# Patient Record
Sex: MALE | Race: WHITE | NOT HISPANIC OR LATINO | Employment: UNEMPLOYED | URBAN - METROPOLITAN AREA
[De-identification: names, ages, dates, MRNs, and addresses within clinical notes are randomized per-mention and may not be internally consistent; named-entity substitution may affect disease eponyms.]

---

## 2023-10-19 ENCOUNTER — OFFICE VISIT (OUTPATIENT)
Dept: PRIMARY CARE | Facility: CLINIC | Age: 58
End: 2023-10-19
Payer: COMMERCIAL

## 2023-10-19 VITALS
WEIGHT: 247 LBS | SYSTOLIC BLOOD PRESSURE: 110 MMHG | DIASTOLIC BLOOD PRESSURE: 78 MMHG | BODY MASS INDEX: 37.44 KG/M2 | RESPIRATION RATE: 18 BRPM | HEART RATE: 90 BPM | HEIGHT: 68 IN | TEMPERATURE: 95.4 F | OXYGEN SATURATION: 98 %

## 2023-10-19 DIAGNOSIS — F10.91 ALCOHOL USE DISORDER IN REMISSION: ICD-10-CM

## 2023-10-19 DIAGNOSIS — H91.93 BILATERAL CHANGE IN HEARING: ICD-10-CM

## 2023-10-19 DIAGNOSIS — Q24.9: ICD-10-CM

## 2023-10-19 DIAGNOSIS — F20.9 SCHIZOPHRENIA, UNSPECIFIED TYPE (MULTI): Primary | ICD-10-CM

## 2023-10-19 DIAGNOSIS — R52 COMPLAINTS OF TOTAL BODY PAIN: ICD-10-CM

## 2023-10-19 DIAGNOSIS — R45.851 SUICIDAL THOUGHTS: ICD-10-CM

## 2023-10-19 DIAGNOSIS — E11.9 TYPE 2 DIABETES MELLITUS WITHOUT COMPLICATION, WITHOUT LONG-TERM CURRENT USE OF INSULIN (MULTI): ICD-10-CM

## 2023-10-19 DIAGNOSIS — R60.0 LOWER LEG EDEMA: ICD-10-CM

## 2023-10-19 LAB
ANION GAP SERPL CALC-SCNC: 14 MMOL/L (ref 10–20)
BNP SERPL-MCNC: 4 PG/ML (ref 0–99)
BUN SERPL-MCNC: 9 MG/DL (ref 6–23)
CALCIUM SERPL-MCNC: 9.4 MG/DL (ref 8.6–10.6)
CHLORIDE SERPL-SCNC: 99 MMOL/L (ref 98–107)
CO2 SERPL-SCNC: 26 MMOL/L (ref 21–32)
CREAT SERPL-MCNC: 0.74 MG/DL (ref 0.5–1.3)
EST. AVERAGE GLUCOSE BLD GHB EST-MCNC: 171 MG/DL
GFR SERPL CREATININE-BSD FRML MDRD: >90 ML/MIN/1.73M*2
GLUCOSE SERPL-MCNC: 173 MG/DL (ref 74–99)
HBA1C MFR BLD: 7.6 %
POTASSIUM SERPL-SCNC: 4.4 MMOL/L (ref 3.5–5.3)
SODIUM SERPL-SCNC: 135 MMOL/L (ref 136–145)

## 2023-10-19 PROCEDURE — 99214 OFFICE O/P EST MOD 30 MIN: CPT | Mod: 25 | Performed by: NURSE PRACTITIONER

## 2023-10-19 PROCEDURE — 80048 BASIC METABOLIC PNL TOTAL CA: CPT | Mod: CMCLAB | Performed by: NURSE PRACTITIONER

## 2023-10-19 PROCEDURE — 3051F HG A1C>EQUAL 7.0%<8.0%: CPT | Performed by: NURSE PRACTITIONER

## 2023-10-19 PROCEDURE — 36415 COLL VENOUS BLD VENIPUNCTURE: CPT | Performed by: NURSE PRACTITIONER

## 2023-10-19 PROCEDURE — 99204 OFFICE O/P NEW MOD 45 MIN: CPT | Performed by: NURSE PRACTITIONER

## 2023-10-19 PROCEDURE — 83036 HEMOGLOBIN GLYCOSYLATED A1C: CPT | Mod: CMCLAB | Performed by: NURSE PRACTITIONER

## 2023-10-19 PROCEDURE — 3078F DIAST BP <80 MM HG: CPT | Performed by: NURSE PRACTITIONER

## 2023-10-19 PROCEDURE — 83880 ASSAY OF NATRIURETIC PEPTIDE: CPT | Performed by: NURSE PRACTITIONER

## 2023-10-19 PROCEDURE — 1036F TOBACCO NON-USER: CPT | Performed by: NURSE PRACTITIONER

## 2023-10-19 PROCEDURE — 3074F SYST BP LT 130 MM HG: CPT | Performed by: NURSE PRACTITIONER

## 2023-10-19 RX ORDER — BLOOD-GLUCOSE SENSOR
EACH MISCELLANEOUS
Qty: 3 EACH | Refills: 3 | Status: SHIPPED | OUTPATIENT
Start: 2023-10-19

## 2023-10-19 RX ORDER — BLOOD-GLUCOSE,RECEIVER,CONT
EACH MISCELLANEOUS
Qty: 1 EACH | Refills: 0 | Status: SHIPPED | OUTPATIENT
Start: 2023-10-19

## 2023-10-19 RX ORDER — DULAGLUTIDE 1.5 MG/.5ML
1.5 INJECTION, SOLUTION SUBCUTANEOUS
Qty: 2 ML | Refills: 11 | Status: SHIPPED | OUTPATIENT
Start: 2023-10-19

## 2023-10-19 ASSESSMENT — COLUMBIA-SUICIDE SEVERITY RATING SCALE - C-SSRS
6. HAVE YOU EVER DONE ANYTHING, STARTED TO DO ANYTHING, OR PREPARED TO DO ANYTHING TO END YOUR LIFE?: YES
2. HAVE YOU ACTUALLY HAD ANY THOUGHTS OF KILLING YOURSELF?: NO
1. IN THE PAST MONTH, HAVE YOU WISHED YOU WERE DEAD OR WISHED YOU COULD GO TO SLEEP AND NOT WAKE UP?: YES

## 2023-10-19 ASSESSMENT — PAIN SCALES - GENERAL: PAINLEVEL: 8

## 2023-10-19 ASSESSMENT — PATIENT HEALTH QUESTIONNAIRE - PHQ9
10. IF YOU CHECKED OFF ANY PROBLEMS, HOW DIFFICULT HAVE THESE PROBLEMS MADE IT FOR YOU TO DO YOUR WORK, TAKE CARE OF THINGS AT HOME, OR GET ALONG WITH OTHER PEOPLE: EXTREMELY DIFFICULT
8. MOVING OR SPEAKING SO SLOWLY THAT OTHER PEOPLE COULD HAVE NOTICED. OR THE OPPOSITE, BEING SO FIGETY OR RESTLESS THAT YOU HAVE BEEN MOVING AROUND A LOT MORE THAN USUAL: NOT AT ALL
4. FEELING TIRED OR HAVING LITTLE ENERGY: MORE THAN HALF THE DAYS
5. POOR APPETITE OR OVEREATING: NEARLY EVERY DAY
7. TROUBLE CONCENTRATING ON THINGS, SUCH AS READING THE NEWSPAPER OR WATCHING TELEVISION: NEARLY EVERY DAY
6. FEELING BAD ABOUT YOURSELF - OR THAT YOU ARE A FAILURE OR HAVE LET YOURSELF OR YOUR FAMILY DOWN: NEARLY EVERY DAY
SUM OF ALL RESPONSES TO PHQ9 QUESTIONS 1 AND 2: 3
2. FEELING DOWN, DEPRESSED OR HOPELESS: NEARLY EVERY DAY
1. LITTLE INTEREST OR PLEASURE IN DOING THINGS: NOT AT ALL

## 2023-10-19 ASSESSMENT — ENCOUNTER SYMPTOMS
LOSS OF SENSATION IN FEET: 1
OCCASIONAL FEELINGS OF UNSTEADINESS: 1
DEPRESSION: 1

## 2023-10-19 NOTE — PROGRESS NOTES
"Subjective   Christopher Saldivar is a 58 y.o. male who presents for Establish Care.  HPI  Mr. Saldivar is a 57 yo M here today to establish care. Has been lost to primary care. Previously seen through [facility]. Is currently staying at [facility]    Diabetes  Currently misusing victoza. Has been taking this medication up to 3x daily.   Denies polydipsia, polyuria, headache, blurred vision, or lightheadedness  He would like to look at alternative ways to treat his diabetes which he feels is uncontrolled.      Bodily pain  Notes entire head to toe full body pain. Has been previously seen by pain management. Failed gabapentin trial. He is open to re-establishment.     Followed by psych through the diversion program. Discussed that they will continue to manage his current medications.   Taking 50mg of trazodone currently without desired sleep assistance.   PMH: Schizophrenia and   Anxiety/ insomnia  He names Ativan by trade name several times noting that he does not often leave the house because he is no longer prescribed the medication and it was the only way he could function. Reports that he has discussed this with his psychiatrist.     Recent concern for a change in hearing   Requesting hearing exam.     All systems reviewed. Review of systems negative except for noted positives in HPI    Objective     /78 (BP Location: Left arm, Patient Position: Sitting, BP Cuff Size: Large adult)   Pulse 90   Temp 35.2 °C (95.4 °F)   Resp 18   Ht 1.727 m (5' 8\")   Wt 112 kg (247 lb)   SpO2 98%   BMI 37.56 kg/m²    Vital signs noted and reviewed.       Physical Exam  Constitutional:       Appearance: Normal appearance.   Cardiovascular:      Rate and Rhythm: Normal rate and regular rhythm.   Pulmonary:      Effort: Pulmonary effort is normal. No respiratory distress.      Breath sounds: Normal breath sounds.   Musculoskeletal:      Right lower leg: Edema present.      Left lower leg: Edema present.   Skin:     General: Skin is " warm and dry.   Neurological:      Mental Status: He is oriented to person, place, and time.   Psychiatric:         Mood and Affect: Mood normal.             Assessment/Plan   Problem List Items Addressed This Visit    None  Visit Diagnoses       Schizophrenia, unspecified type (CMS/HCC)    -  Primary    Relevant Orders    Referral to Psychiatry    Type 2 diabetes mellitus without complication, without long-term current use of insulin (CMS/[facility])        Relevant Medications    dulaglutide (Trulicity) 1.5 mg/0.5 mL pen injector injection    Dexcom G4 platinum  (Dexcom G7 ) misc    blood-glucose sensor (Dexcom G7 Sensor) device    Other Relevant Orders    Hemoglobin A1C (Completed)    Basic Metabolic Panel (Completed)    Referral to Ophthalmology    Suicidal thoughts        Relevant Orders    Referral to Psychiatry    Complaints of total body pain        Relevant Orders    Referral to Pain Medicine    Bilateral change in hearing        Relevant Orders    Referral to Audiology    Cardiac anomaly        Relevant Orders    Referral to Cardiology    Alcohol use disorder in remission        Relevant Orders    Referral to Psychiatry    Lower leg edema        Relevant Orders    B-type natriuretic peptide (Completed)

## 2023-10-19 NOTE — PATIENT INSTRUCTIONS
Follow up in 4 months or sooner if needed.     STOP victoza.   START Trulicity once weekly.   Continue to self monitor your blood sugar and take your medications, as directed.   Decrease refined sugars and carbohydrates in your diet.   DO NOT SKIP MEALS! It is important for your blood sugar to have small healthy meals throughout the day.  Make sure to keep a snack on your person at all times, if needed, for low blood sugar.  Exercise for 30 minutes daily.    Drink adequate liquids before, during, and after exercise to prevent dehydration, which can alter blood sugar levels.    Today we completed blood work. We will contact you with any abnormalities from this testing.      Call to schedule with pain management, audiology, opthalmology, cardiology, and psychiatry.     If you ever feel you are a danger to yourself or others, call 911 or go to the nearest emergency room.

## 2023-10-31 ENCOUNTER — TELEPHONE (OUTPATIENT)
Dept: CARDIOLOGY | Facility: HOSPITAL | Age: 58
End: 2023-10-31
Payer: COMMERCIAL

## 2023-10-31 NOTE — TELEPHONE ENCOUNTER
Explained to patient he will be cancelled tomorrow with dr alejandro and he can call [facility] to schedule an appt. Explained reason all questions answered to patients satisfaction

## 2023-11-01 ENCOUNTER — APPOINTMENT (OUTPATIENT)
Dept: CARDIOLOGY | Facility: HOSPITAL | Age: 58
End: 2023-11-01
Payer: COMMERCIAL

## 2023-11-10 ENCOUNTER — HOSPITAL ENCOUNTER (OUTPATIENT)
Dept: RADIOLOGY | Facility: HOSPITAL | Age: 58
Discharge: HOME | End: 2023-11-10
Payer: COMMERCIAL

## 2023-11-10 ENCOUNTER — OFFICE VISIT (OUTPATIENT)
Dept: PAIN MEDICINE | Facility: CLINIC | Age: 58
End: 2023-11-10
Payer: COMMERCIAL

## 2023-11-10 VITALS
DIASTOLIC BLOOD PRESSURE: 86 MMHG | HEIGHT: 68 IN | HEART RATE: 89 BPM | SYSTOLIC BLOOD PRESSURE: 129 MMHG | WEIGHT: 250 LBS | BODY MASS INDEX: 37.89 KG/M2

## 2023-11-10 DIAGNOSIS — G89.29 CHRONIC RIGHT-SIDED LOW BACK PAIN WITH RIGHT-SIDED SCIATICA: ICD-10-CM

## 2023-11-10 DIAGNOSIS — R52 COMPLAINTS OF TOTAL BODY PAIN: ICD-10-CM

## 2023-11-10 DIAGNOSIS — M54.16 LUMBAR RADICULOPATHY: Primary | ICD-10-CM

## 2023-11-10 DIAGNOSIS — M54.41 CHRONIC RIGHT-SIDED LOW BACK PAIN WITH RIGHT-SIDED SCIATICA: ICD-10-CM

## 2023-11-10 DIAGNOSIS — M54.16 LUMBAR RADICULOPATHY: ICD-10-CM

## 2023-11-10 PROCEDURE — 1036F TOBACCO NON-USER: CPT | Performed by: PAIN MEDICINE

## 2023-11-10 PROCEDURE — 99203 OFFICE O/P NEW LOW 30 MIN: CPT | Performed by: PAIN MEDICINE

## 2023-11-10 PROCEDURE — 72110 X-RAY EXAM L-2 SPINE 4/>VWS: CPT

## 2023-11-10 PROCEDURE — 72110 X-RAY EXAM L-2 SPINE 4/>VWS: CPT | Mod: FY

## 2023-11-10 PROCEDURE — 72110 X-RAY EXAM L-2 SPINE 4/>VWS: CPT | Performed by: STUDENT IN AN ORGANIZED HEALTH CARE EDUCATION/TRAINING PROGRAM

## 2023-11-10 RX ORDER — DOXEPIN HYDROCHLORIDE 50 MG/1
50 CAPSULE ORAL
COMMUNITY
Start: 2023-10-24 | End: 2023-11-22 | Stop reason: ALTCHOICE

## 2023-11-10 RX ORDER — LITHIUM CARBONATE 300 MG/1
CAPSULE ORAL
COMMUNITY
Start: 2023-10-24 | End: 2023-11-22 | Stop reason: SDUPTHER

## 2023-11-10 RX ORDER — DULOXETIN HYDROCHLORIDE 60 MG/1
60 CAPSULE, DELAYED RELEASE ORAL
COMMUNITY
Start: 2023-10-31 | End: 2023-11-22

## 2023-11-10 RX ORDER — IBUPROFEN 600 MG/1
600 TABLET ORAL 4 TIMES DAILY PRN
COMMUNITY
Start: 2023-10-30

## 2023-11-10 RX ORDER — LIDOCAINE 50 MG/G
PATCH TOPICAL
COMMUNITY
Start: 2023-10-20

## 2023-11-10 RX ORDER — TOPIRAMATE 25 MG/1
TABLET ORAL
Qty: 300 TABLET | Refills: 0 | Status: SHIPPED | OUTPATIENT
Start: 2023-11-10 | End: 2024-01-09

## 2023-11-10 SDOH — SOCIAL STABILITY: SOCIAL NETWORK: SOCIAL ACTIVITY:: 6

## 2023-11-10 ASSESSMENT — PAIN SCALES - GENERAL
PAINLEVEL: 10-WORST PAIN EVER
PAINLEVEL_OUTOF10: 10 - WORST POSSIBLE PAIN

## 2023-11-10 ASSESSMENT — PAIN DESCRIPTION - DESCRIPTORS: DESCRIPTORS: ACHING;BURNING

## 2023-11-10 ASSESSMENT — PAIN - FUNCTIONAL ASSESSMENT: PAIN_FUNCTIONAL_ASSESSMENT: 0-10

## 2023-11-10 NOTE — PROGRESS NOTES
Subjective   Patient ID: Christopher Saldivar is a 58 y.o. male who presents for Back Pain (C/O lower back pain, radiates down legs).    HPI:   Patient with history of diabetes, bipolar disorder, and alcohol use disorder currently in remission presents to the clinic as a new patient for a 2 week history of low back pain that radiates down his bilateral lower extremities. He had a previous injury 5 years ago that presented with similar symptoms and was treated at [facility]. MRI at the time showed epidural lipomatosis with mild canal stenosis. He had two epidural steroid injections that did not improve his pain at the time. After he recovered from his injury, he was pain free until 2 weeks ago. The pain is severe when walking and radiates down the anterolateral surface of his bilateral lower extremities to just below the knees. His pain improves with leaning forward. He has numbness and tingling from below the knees to his feet and has a history of diabetic neuropathy. He reports weakness in his bilateral lower extremities. He has been taking ibuprofen and using lidocaine patches without pain relief. He has not done physical therapy since his initial injury 5 years ago.     Review of Systems   13-point ROS done and negative except for HPI.       Current Outpatient Medications:     doxepin (SINEquan) 50 mg capsule, Take 1 capsule (50 mg) by mouth., Disp: , Rfl:     DULoxetine (Cymbalta) 60 mg DR capsule, Take 1 capsule (60 mg) by mouth once daily., Disp: , Rfl:     ibuprofen 600 mg tablet, Take 1 tablet (600 mg) by mouth 4 times a day as needed., Disp: , Rfl:     lidocaine (Lidoderm) 5 % patch, , Disp: , Rfl:     lithium 300 mg capsule, , Disp: , Rfl:     blood-glucose sensor (Dexcom G7 Sensor) device, Apply 1 sensor every 10 days for continuous glucose monitoring, Disp: 3 each, Rfl: 3    Dexcom G4 platinum  (Dexcom G7 ) misc, Use as instructed, Disp: 1 each, Rfl: 0    dulaglutide (Trulicity) 1.5 mg/0.5 mL  pen injector injection, Inject 1.5 mg under the skin 1 (one) time per week., Disp: 2 mL, Rfl: 11     No past medical history on file.     No past surgical history on file.     No family history on file.     No Known Allergies     Objective     Vitals:    11/10/23 0748   BP: 129/86   Pulse: 89        Physical Exam      General: NAD, well groomed, well nourished  Eyes: Non-icteric sclera, EOMI  Ears, Nose, Mouth, and Throat: External ears and nose appear to be without deformity or rash. No lesions or masses noted. Hearing is grossly intact.   Neck: Trachea midline  Respiratory: Nonlabored breathing   Cardiovascular: 2+ peripheral edema   Skin: No rashes or open lesions/ulcers identified on skin.    Back:   Palpation: Tenderness to palpation over lumbar paraspinous muscles.   Straight leg raise: Reproduces their pain, bilaterally  SI Joint: No tenderness to palpation over SI joint, bilaterally. No pain with SRINIVASAN test, bilaterally    Neurologic:   Cranial nerves grossly intact.   Strength: 4/5 and symmetric throughout.   Sensation: Diminished sensation to light touch over anterolateral surface of bilateral thighs, diminished sensation to pinprick on bilateral feet in L4 and L5 distribution.    Psychiatric: Alert, orientation to person, place, and time. Cooperative.      Assessment/Plan   Christopher Saldivar is a 58 year old male with history of diabetes, bipolar disorder, and alcohol use disorder currently in remission who presents with lumbar back pain that radiates to his bilateral lower extremities. MRI report (cannot review the images) from 2019 showed epidural lipomatosis with mild canal stenosis. History and exam suspicious for neurogenic claudication. The patient would benefit from a PT referral and initiation of topiramate. Will obtain a lumbar x-ray.      Plan:  - Topiramate titration  - PT referral  - Lumbar x-ray     The patient was invited to contact us back anytime with any questions or concerns and follow-up  with us in the office as needed.
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
## 2023-11-15 PROBLEM — E11.9 TYPE 2 DIABETES MELLITUS WITHOUT COMPLICATION, WITHOUT LONG-TERM CURRENT USE OF INSULIN (MULTI): Status: ACTIVE | Noted: 2019-11-14

## 2023-11-15 PROBLEM — F31.4: Status: ACTIVE | Noted: 2023-10-27

## 2023-11-15 PROBLEM — F10.20 ALCOHOL USE DISORDER, SEVERE, DEPENDENCE (MULTI): Status: ACTIVE | Noted: 2023-10-27

## 2023-11-15 PROBLEM — R06.09 DYSPNEA ON EXERTION: Status: ACTIVE | Noted: 2022-06-14

## 2023-11-15 PROBLEM — S39.012A STRAIN OF LUMBAR REGION: Status: ACTIVE | Noted: 2019-02-21

## 2023-11-15 PROBLEM — K76.0 HEPATIC STEATOSIS: Status: ACTIVE | Noted: 2019-06-24

## 2023-11-15 PROBLEM — F10.11 ALCOHOL ABUSE, IN REMISSION: Status: ACTIVE | Noted: 2023-10-04

## 2023-11-15 PROBLEM — E66.9 OBESITY: Status: ACTIVE | Noted: 2020-08-17

## 2023-11-15 PROBLEM — M79.604 PAIN OF RIGHT LOWER EXTREMITY: Status: ACTIVE | Noted: 2023-11-15

## 2023-11-15 PROBLEM — I71.20 THORACIC AORTIC ANEURYSM WITHOUT RUPTURE (CMS-HCC): Status: ACTIVE | Noted: 2019-09-30

## 2023-11-15 PROBLEM — R26.2 INABILITY TO WALK: Status: ACTIVE | Noted: 2019-01-25

## 2023-11-15 PROBLEM — F43.10 POSTTRAUMATIC STRESS DISORDER: Status: ACTIVE | Noted: 2023-10-27

## 2023-11-15 PROBLEM — G82.20 PARAPARESIS (MULTI): Status: ACTIVE | Noted: 2023-10-20

## 2023-11-15 PROBLEM — M48.062 SPINAL STENOSIS OF LUMBAR REGION WITH NEUROGENIC CLAUDICATION: Status: ACTIVE | Noted: 2023-11-15

## 2023-11-15 PROBLEM — M54.10: Status: ACTIVE | Noted: 2019-02-21

## 2023-11-15 PROBLEM — M79.10 MYALGIA: Status: ACTIVE | Noted: 2019-01-29

## 2023-11-15 PROBLEM — R70.0 ELEVATED SED RATE: Status: ACTIVE | Noted: 2019-01-21

## 2023-11-15 PROBLEM — S06.4XAA EPIDURAL HEMATOMA (MULTI): Status: ACTIVE | Noted: 2017-08-09

## 2023-11-15 PROBLEM — G89.4 CHRONIC PAIN SYNDROME: Status: ACTIVE | Noted: 2019-10-01

## 2023-11-15 RX ORDER — ALCOHOL ANTISEPTIC PADS
PADS, MEDICATED (EA) TOPICAL
COMMUNITY
Start: 2023-09-20

## 2023-11-15 RX ORDER — QUETIAPINE FUMARATE 100 MG/1
1 TABLET, FILM COATED ORAL NIGHTLY
COMMUNITY
Start: 2023-11-13 | End: 2023-11-22 | Stop reason: ALTCHOICE

## 2023-11-15 RX ORDER — LISINOPRIL 10 MG/1
1 TABLET ORAL DAILY
COMMUNITY
Start: 2022-08-04

## 2023-11-15 RX ORDER — ATORVASTATIN CALCIUM 40 MG/1
40 TABLET, FILM COATED ORAL
COMMUNITY
Start: 2022-09-15 | End: 2023-11-28 | Stop reason: DRUGHIGH

## 2023-11-15 RX ORDER — GABAPENTIN 100 MG/1
100 CAPSULE ORAL 3 TIMES DAILY
COMMUNITY
Start: 2022-06-15

## 2023-11-15 RX ORDER — IBUPROFEN 200 MG
600 TABLET ORAL EVERY 6 HOURS PRN
COMMUNITY
Start: 2023-10-30

## 2023-11-15 RX ORDER — LANCETS 30 GAUGE
EACH MISCELLANEOUS
COMMUNITY
Start: 2023-09-20

## 2023-11-15 RX ORDER — DICLOFENAC SODIUM 10 MG/G
GEL TOPICAL
COMMUNITY
Start: 2022-08-04

## 2023-11-15 RX ORDER — OMEPRAZOLE 20 MG/1
20 CAPSULE, DELAYED RELEASE ORAL
COMMUNITY
Start: 2022-06-16

## 2023-11-15 RX ORDER — LISINOPRIL 10 MG/1
10 TABLET ORAL
COMMUNITY
Start: 2023-10-09 | End: 2024-01-07

## 2023-11-15 RX ORDER — PEN NEEDLE, DIABETIC 32GX 5/32"
NEEDLE, DISPOSABLE MISCELLANEOUS
COMMUNITY
Start: 2023-09-20

## 2023-11-15 RX ORDER — ALBUTEROL SULFATE 90 UG/1
AEROSOL, METERED RESPIRATORY (INHALATION)
COMMUNITY
Start: 2023-10-09

## 2023-11-15 RX ORDER — BUPROPION HYDROCHLORIDE 300 MG/1
TABLET ORAL
COMMUNITY
Start: 2023-11-09 | End: 2023-11-22 | Stop reason: ALTCHOICE

## 2023-11-15 RX ORDER — AMLODIPINE BESYLATE 10 MG/1
10 TABLET ORAL
COMMUNITY
Start: 2023-10-09 | End: 2024-01-07

## 2023-11-15 RX ORDER — BLOOD-GLUCOSE METER
EACH MISCELLANEOUS
COMMUNITY
Start: 2023-09-20

## 2023-11-15 RX ORDER — AMLODIPINE BESYLATE 5 MG/1
1 TABLET ORAL DAILY
COMMUNITY
Start: 2022-08-04

## 2023-11-15 RX ORDER — LIRAGLUTIDE 6 MG/ML
INJECTION SUBCUTANEOUS
COMMUNITY

## 2023-11-15 RX ORDER — BUPROPION HYDROCHLORIDE 150 MG/1
150 TABLET ORAL
COMMUNITY
Start: 2023-10-31 | End: 2023-11-22 | Stop reason: ALTCHOICE

## 2023-11-15 RX ORDER — COVID-19 ANTIGEN TEST
KIT MISCELLANEOUS
COMMUNITY
Start: 2022-12-06

## 2023-11-15 RX ORDER — OMEPRAZOLE 20 MG/1
1 CAPSULE, DELAYED RELEASE ORAL
COMMUNITY
Start: 2023-10-20 | End: 2024-01-18

## 2023-11-15 RX ORDER — ACETAMINOPHEN 500 MG
1 TABLET ORAL
COMMUNITY
Start: 2021-04-05

## 2023-11-15 RX ORDER — PEN NEEDLE, DIABETIC 31 GX5/16"
NEEDLE, DISPOSABLE MISCELLANEOUS
COMMUNITY
Start: 2023-10-06

## 2023-11-15 RX ORDER — BLOOD SUGAR DIAGNOSTIC
STRIP MISCELLANEOUS
COMMUNITY
Start: 2023-09-20

## 2023-11-15 RX ORDER — HYDROXYZINE HYDROCHLORIDE 25 MG/1
1 TABLET, FILM COATED ORAL NIGHTLY PRN
COMMUNITY
Start: 2020-06-04

## 2023-11-15 RX ORDER — DULOXETIN HYDROCHLORIDE 30 MG/1
CAPSULE, DELAYED RELEASE ORAL
COMMUNITY
Start: 2023-10-09 | End: 2023-11-22

## 2023-11-15 RX ORDER — CICLOPIROX 80 MG/ML
SOLUTION TOPICAL
COMMUNITY
Start: 2023-10-17

## 2023-11-15 RX ORDER — CHOLECALCIFEROL (VITAMIN D3) 25 MCG
1 TABLET ORAL DAILY
COMMUNITY
Start: 2022-06-16

## 2023-11-15 RX ORDER — PRAZOSIN HYDROCHLORIDE 1 MG/1
1 CAPSULE ORAL
COMMUNITY
Start: 2023-10-09 | End: 2023-11-22 | Stop reason: DRUGHIGH

## 2023-11-15 RX ORDER — CLONIDINE HYDROCHLORIDE 0.1 MG/1
0.1 TABLET ORAL
COMMUNITY
Start: 2023-10-24

## 2023-11-15 RX ORDER — TRAZODONE HYDROCHLORIDE 50 MG/1
50 TABLET ORAL
COMMUNITY
Start: 2023-10-09 | End: 2023-11-22 | Stop reason: SDUPTHER

## 2023-11-15 RX ORDER — METFORMIN HYDROCHLORIDE 500 MG/1
1000 TABLET ORAL
COMMUNITY
Start: 2020-11-03

## 2023-11-15 RX ORDER — LANOLIN ALCOHOL/MO/W.PET/CERES
1 CREAM (GRAM) TOPICAL DAILY
COMMUNITY
Start: 2022-06-16

## 2023-11-15 RX ORDER — ASPIRIN 81 MG/1
1 TABLET ORAL DAILY
COMMUNITY
Start: 2022-06-16

## 2023-11-15 RX ORDER — ARIPIPRAZOLE 5 MG/1
5 TABLET ORAL
COMMUNITY
Start: 2023-10-09 | End: 2023-11-22 | Stop reason: DRUGHIGH

## 2023-11-22 ENCOUNTER — OFFICE VISIT (OUTPATIENT)
Dept: BEHAVIORAL HEALTH | Facility: CLINIC | Age: 58
End: 2023-11-22
Payer: COMMERCIAL

## 2023-11-22 VITALS
SYSTOLIC BLOOD PRESSURE: 135 MMHG | HEART RATE: 96 BPM | WEIGHT: 258 LBS | BODY MASS INDEX: 39.1 KG/M2 | DIASTOLIC BLOOD PRESSURE: 88 MMHG | HEIGHT: 68 IN | TEMPERATURE: 98 F

## 2023-11-22 DIAGNOSIS — F43.10 PTSD (POST-TRAUMATIC STRESS DISORDER): ICD-10-CM

## 2023-11-22 DIAGNOSIS — F10.91 ALCOHOL USE DISORDER IN REMISSION: ICD-10-CM

## 2023-11-22 DIAGNOSIS — F31.9 BIPOLAR AFFECTIVE DISORDER, REMISSION STATUS UNSPECIFIED (MULTI): ICD-10-CM

## 2023-11-22 PROCEDURE — 3079F DIAST BP 80-89 MM HG: CPT | Performed by: PSYCHIATRY & NEUROLOGY

## 2023-11-22 PROCEDURE — 99204 OFFICE O/P NEW MOD 45 MIN: CPT | Performed by: PSYCHIATRY & NEUROLOGY

## 2023-11-22 PROCEDURE — 4010F ACE/ARB THERAPY RXD/TAKEN: CPT | Performed by: PSYCHIATRY & NEUROLOGY

## 2023-11-22 PROCEDURE — 3075F SYST BP GE 130 - 139MM HG: CPT | Performed by: PSYCHIATRY & NEUROLOGY

## 2023-11-22 PROCEDURE — 1036F TOBACCO NON-USER: CPT | Performed by: PSYCHIATRY & NEUROLOGY

## 2023-11-22 PROCEDURE — 3051F HG A1C>EQUAL 7.0%<8.0%: CPT | Performed by: PSYCHIATRY & NEUROLOGY

## 2023-11-22 RX ORDER — PRAZOSIN HYDROCHLORIDE 2 MG/1
2 CAPSULE ORAL NIGHTLY
Qty: 30 CAPSULE | Refills: 1 | Status: SHIPPED | OUTPATIENT
Start: 2023-11-22 | End: 2024-01-21

## 2023-11-22 RX ORDER — ARIPIPRAZOLE 10 MG/1
10 TABLET ORAL DAILY
Qty: 30 TABLET | Refills: 1 | Status: SHIPPED | OUTPATIENT
Start: 2023-11-22 | End: 2024-01-21

## 2023-11-22 RX ORDER — LITHIUM CARBONATE 300 MG/1
300 CAPSULE ORAL
Qty: 60 CAPSULE | Refills: 1 | Status: SHIPPED | OUTPATIENT
Start: 2023-11-22 | End: 2024-01-21

## 2023-11-22 RX ORDER — TRAZODONE HYDROCHLORIDE 50 MG/1
TABLET ORAL
Qty: 60 TABLET | Refills: 1 | Status: SHIPPED | OUTPATIENT
Start: 2023-11-22

## 2023-11-22 ASSESSMENT — ENCOUNTER SYMPTOMS
PALPITATIONS: 0
DECREASED CONCENTRATION: 1
DYSPHORIC MOOD: 0
VOMITING: 0
BACK PAIN: 1
NERVOUS/ANXIOUS: 0
DIARRHEA: 1
HYPERACTIVE: 0
SLEEP DISTURBANCE: 1
NAUSEA: 0
TREMORS: 1
CONFUSION: 0
SHORTNESS OF BREATH: 1
HALLUCINATIONS: 1
AGITATION: 0
SEIZURES: 0
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
## 2023-11-28 ENCOUNTER — PHARMACY VISIT (OUTPATIENT)
Dept: PHARMACY | Facility: CLINIC | Age: 58
End: 2023-11-28
Payer: MEDICAID

## 2023-11-28 ENCOUNTER — OFFICE VISIT (OUTPATIENT)
Dept: CARDIOLOGY | Facility: HOSPITAL | Age: 58
End: 2023-11-28
Payer: COMMERCIAL

## 2023-11-28 VITALS
OXYGEN SATURATION: 94 % | HEIGHT: 68 IN | HEART RATE: 105 BPM | DIASTOLIC BLOOD PRESSURE: 86 MMHG | WEIGHT: 256.8 LBS | SYSTOLIC BLOOD PRESSURE: 123 MMHG | BODY MASS INDEX: 38.92 KG/M2

## 2023-11-28 DIAGNOSIS — I50.30 (HFPEF) HEART FAILURE WITH PRESERVED EJECTION FRACTION (MULTI): ICD-10-CM

## 2023-11-28 DIAGNOSIS — I71.20 THORACIC AORTIC ANEURYSM WITHOUT RUPTURE (CMS-HCC): ICD-10-CM

## 2023-11-28 DIAGNOSIS — R60.0 BILATERAL LEG EDEMA: ICD-10-CM

## 2023-11-28 DIAGNOSIS — E11.9 TYPE 2 DIABETES MELLITUS WITHOUT COMPLICATION, WITHOUT LONG-TERM CURRENT USE OF INSULIN (MULTI): ICD-10-CM

## 2023-11-28 DIAGNOSIS — I10 PRIMARY HYPERTENSION: Primary | ICD-10-CM

## 2023-11-28 PROCEDURE — 99214 OFFICE O/P EST MOD 30 MIN: CPT | Performed by: INTERNAL MEDICINE

## 2023-11-28 PROCEDURE — 1036F TOBACCO NON-USER: CPT | Performed by: INTERNAL MEDICINE

## 2023-11-28 PROCEDURE — 4010F ACE/ARB THERAPY RXD/TAKEN: CPT | Performed by: INTERNAL MEDICINE

## 2023-11-28 PROCEDURE — RXMED WILLOW AMBULATORY MEDICATION CHARGE

## 2023-11-28 PROCEDURE — 99204 OFFICE O/P NEW MOD 45 MIN: CPT | Performed by: INTERNAL MEDICINE

## 2023-11-28 PROCEDURE — 3074F SYST BP LT 130 MM HG: CPT | Performed by: INTERNAL MEDICINE

## 2023-11-28 PROCEDURE — 93005 ELECTROCARDIOGRAM TRACING: CPT | Performed by: INTERNAL MEDICINE

## 2023-11-28 PROCEDURE — 3051F HG A1C>EQUAL 7.0%<8.0%: CPT | Performed by: INTERNAL MEDICINE

## 2023-11-28 PROCEDURE — 3079F DIAST BP 80-89 MM HG: CPT | Performed by: INTERNAL MEDICINE

## 2023-11-28 RX ORDER — ATORVASTATIN CALCIUM 80 MG/1
80 TABLET, FILM COATED ORAL DAILY
Qty: 30 TABLET | Refills: 11 | Status: SHIPPED | OUTPATIENT
Start: 2023-11-28 | End: 2024-11-27

## 2023-11-28 ASSESSMENT — PATIENT HEALTH QUESTIONNAIRE - PHQ9
2. FEELING DOWN, DEPRESSED OR HOPELESS: NOT AT ALL
SUM OF ALL RESPONSES TO PHQ9 QUESTIONS 1 AND 2: 0
1. LITTLE INTEREST OR PLEASURE IN DOING THINGS: NOT AT ALL

## 2023-11-28 ASSESSMENT — PAIN SCALES - GENERAL: PAINLEVEL: 0-NO PAIN

## 2023-11-28 NOTE — PROGRESS NOTES
Christopher Saldivar is a 57 yo M presenting for cardiology review on a background of HTN, HLD, ascending aortic aneurysm, obesity, DM2, bipolar disorder, KHOI, lower back pain.     Investigations:  Dobutamine stress echo (2022) - LVEF 55%, RVSP 40 mmHg, Asc Ao 4.4, Aortic 3.8 cm, no ischemia.   CTPE (2022) - ascending aorta 4.6 cm, no PE, 3 vessel coronary artery calcifications.  BNP (10/2023) - 4.  EKG - NSR.    CV risk:  Cr 0.74  HbA1c 7.6%  ,  - on atorvastatin 40mg.   Fam hx -  ?MI age 88 yo.   Ex-smoker - quit 10 years ago.     Progress:  Walking limited by back pain and shortness of breath.   Lower limb edema over the last 12-months.   Sleeps on 2-pillows.  Occasionally wakes up SOB.   No chest pain.   Palpitations with pain.   Intermittent postural dizziness.  No prior UTIs.     Review of Systems:  A 10-system review was performed and was unremarkable apart from what is presented in the HPI.     Examination:  Alert and orientated.   Appropriate responses, normal affect.  No respiratory distress at rest.   Skin warm and dry.   Normal radial pulse character and volume. Clinically SR.   Anicteric sclera, no conjunctival pallor.   No JVD or carotid bruits.  Heart sounds dual, no added heart sounds or audible murmurs.   Chest clear on auscultation.   Calves soft, non-tender.  No pedal edema.  EKG - NSR.     Impression:  In summary, Mr Saldivar is a 57 yo M presenting for cardiology review on a background of HTN, HLD, ascending aortic aneurysm, obesity, DM2, bipolar disorder, KHOI, lower back pain. He has had some exertional dyspnea but he appeared euvolemic on examination today. His LDL remains above target and I have increased his atorvastatin dose to 80 mg daily. I have commenced him on Jardiance 10mg daily for management of both DM2 and CVD. He is to have lab work 1-2 weeks after starting Jardiance. I have ordered an MRA/CMR to assess his aortic aneurysm and aortic valve. I will follow-up  with Mr Saldivar after his investigations.

## 2023-11-28 NOTE — PATIENT INSTRUCTIONS
Thank you for attending the Cardiology clinic at [facility] today. It was nice to meet you.     Your cardiovascular examination demonstrated normal blood pressure, normal heart sounds and mild leg swelling. Your EKG showed a normal heart rhythm.     I will commence you on Jardiance 10mg daily, which is a diabetes medication that is also good for the heart. It promotes removal of sugar via the urine, can reduce fluid retention/weight and improve your blood sugars.     You need to have labs collected to check your kidney function 2-weeks after starting Jardiance.    I have ordered a cardiac MRI to assess your aortic aneurysm and the 'hole' in your heart. Please call [phone] to order this test.    I will follow-up with you in 3-months to assess your progress.

## 2023-12-04 ENCOUNTER — CLINICAL SUPPORT (OUTPATIENT)
Dept: AUDIOLOGY | Facility: HOSPITAL | Age: 58
End: 2023-12-04
Payer: COMMERCIAL

## 2023-12-04 DIAGNOSIS — H90.3 SENSORINEURAL HEARING LOSS (SNHL) OF BOTH EARS: Primary | ICD-10-CM

## 2023-12-04 DIAGNOSIS — H91.93 BILATERAL CHANGE IN HEARING: ICD-10-CM

## 2023-12-04 PROCEDURE — 92557 COMPREHENSIVE HEARING TEST: CPT

## 2023-12-04 PROCEDURE — 92567 TYMPANOMETRY: CPT

## 2023-12-04 ASSESSMENT — PAIN SCALES - GENERAL: PAINLEVEL_OUTOF10: 0 - NO PAIN

## 2023-12-04 ASSESSMENT — PAIN - FUNCTIONAL ASSESSMENT: PAIN_FUNCTIONAL_ASSESSMENT: 0-10

## 2023-12-04 NOTE — PROGRESS NOTES
"AUDIOLOGY ADULT AUDIOMETRIC EVALUATION      Name:  Christopher Saldivar  :  1965  Age:  58 y.o.  Date of Evaluation:  2023    History:  Reason for visit:  Mr. Saldivar is seen today for an initial audiologic evaluation at the request of TOMMY Graham due to complaints of gradual decrease in hearing in both ears.  History obtained from patient report and chart review.     Change in Hearing: yes in both ears which began a few years ago  Difficult listening environments: has noticed he has had the television loud recently   Tinnitus: yes in both ears intermittent, bothersome, occasionally pulsatile, and described as buzzing sensation  Otalgia: yes in the right ear occasionally, but none today  Aural Pressure/Fullness: yes in the right ear greater than the left ear   Otorrhea: denied   Dizziness: yes, described as spinning/dizziness, lasts for about half a minute, and occurs 2-3x/week, unknown trigger and goes away with lying down.    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  Family History of Hearing Loss: Denied  Hearing Aid Use: None  Other Significant History: Denied    EVALUATION    See scanned audiogram in \"Media\"     RESULTS:    Otoscopic Evaluation:  Right Ear: Ear canal clear, TM visualized with cone of light  Left Ear: Ear canal clear, TM visualized with cone of light        Immittance Testing (226 Hz):   Right Ear: Type A, normal middle ear pressure and TM compliance  Left Ear: Type A, normal middle ear pressure and TM compliance    Test technique:  Pure Tone Audiometry via insert earphones    Reliability:   good    Pure Tone Audiometry:  Hearing sensitivity within normal limits for 125-250 Hz, sloping to a mild sensorineural hearing loss through 8000 Hz bilaterally.     Speech " Audiometry:   Right Ear:  Speech Reception Threshold (SRT) was obtained at 30 dBHL  Word Recognition scores were excellent in quiet when words were presented at 80 dBHL  Left Ear:  Speech Reception Threshold (SRT) was obtained at 25 dBHL  Word Recognition scores were excellent in quiet when words were presented at 75 dBHL    IMPRESSIONS:  Today's test results are hearing loss requiring audiologic follow-up.     Amplification needs:  Mr. Saldivar does not currently meet Medicare's required PTA to qualify for hearing aids.     RECOMMENDATIONS:  Return annually for audiologic evaluation, or sooner if concerns arise.   Follow-up with primary care provider as recommended.   Contact insurance company to inquire about where is in network for hearing aids if there is a change in hearing.      PATIENT EDUCATION:   Discussed results and recommendations with Mr. Saldivar. Questions were addressed and the patient was encouraged to contact our department ([phone]) should concerns arise.     Comfort Guy, [facility]  Licensed Audiologist    TIME: 1030 - 1058        Degree of   Hearing Sensitivity dB Range   Within Normal Limits (WNL) 0 - 20   Slight 25   Mild 26 - 40   Moderate 41 - 55   Moderately-Severe 56 - 70   Severe 71 - 90   Profound 91 +      KEY  TM Tympanic Membrane   WNL Within Normal Limits   HA Hearing Aid   SNHL Sensorineural Hearing Loss   CHL Conductive Hearing Loss   NIHL Noise-Induced Hearing Loss   ECV Ear Canal Volume

## 2023-12-05 LAB
ATRIAL RATE: 99 BPM
P AXIS: 40 DEGREES
P OFFSET: 187 MS
P ONSET: 129 MS
PR INTERVAL: 162 MS
Q ONSET: 210 MS
QRS COUNT: 17 BEATS
QRS DURATION: 92 MS
QT INTERVAL: 346 MS
QTC CALCULATION(BAZETT): 444 MS
QTC FREDERICIA: 408 MS
R AXIS: 4 DEGREES
T AXIS: 30 DEGREES
T OFFSET: 383 MS
VENTRICULAR RATE: 99 BPM

## 2023-12-27 PROBLEM — I50.30 HEART FAILURE WITH PRESERVED EJECTION FRACTION (MULTI): Status: ACTIVE | Noted: 2023-11-28

## 2023-12-27 PROBLEM — F10.11 NONDEPENDENT ALCOHOL ABUSE, IN REMISSION: Status: ACTIVE | Noted: 2023-10-04

## 2023-12-27 PROBLEM — K76.0 STEATOSIS OF LIVER: Status: ACTIVE | Noted: 2019-06-24

## 2023-12-27 PROBLEM — T14.8XXA HEMATOMA: Status: ACTIVE | Noted: 2017-08-09

## 2023-12-27 PROBLEM — R70.0 ELEVATED ERYTHROCYTE SEDIMENTATION RATE: Status: ACTIVE | Noted: 2019-01-21

## 2023-12-27 PROBLEM — Q24.9: Status: ACTIVE | Noted: 2023-10-19

## 2023-12-27 PROBLEM — F20.9 SCHIZOPHRENIA, UNSPECIFIED (MULTI): Status: ACTIVE | Noted: 2023-10-19

## 2023-12-27 PROBLEM — F10.91 ALCOHOL USE, UNSPECIFIED, IN REMISSION: Status: ACTIVE | Noted: 2023-10-19

## 2023-12-27 PROBLEM — M79.10 MUSCLE PAIN: Status: ACTIVE | Noted: 2019-01-29

## 2023-12-27 PROBLEM — F31.4 SEVERE DEPRESSED BIPOLAR I DISORDER WITHOUT PSYCHOTIC FEATURES (MULTI): Status: ACTIVE | Noted: 2023-10-27

## 2023-12-27 PROBLEM — R60.0 EDEMA OF BOTH LOWER EXTREMITIES: Status: ACTIVE | Noted: 2023-10-19

## 2023-12-27 PROBLEM — E11.9 TYPE 2 DIABETES MELLITUS WITHOUT COMPLICATION (MULTI): Status: ACTIVE | Noted: 2019-11-14

## 2023-12-27 PROBLEM — F43.10 POSTTRAUMATIC STRESS DISORDER: Status: ACTIVE | Noted: 2023-10-27

## 2023-12-27 PROBLEM — R26.2 UNABLE TO WALK: Status: ACTIVE | Noted: 2019-01-25

## 2023-12-27 PROBLEM — H91.93 UNSPECIFIED HEARING LOSS, BILATERAL: Status: ACTIVE | Noted: 2023-10-19

## 2023-12-27 PROBLEM — R52 TOTAL BODY PAIN: Status: ACTIVE | Noted: 2023-10-19

## 2023-12-27 PROBLEM — R45.851 SUICIDAL IDEATIONS: Status: ACTIVE | Noted: 2023-10-19

## 2023-12-27 PROBLEM — I71.20 THORACIC AORTIC ANEURYSM, WITHOUT RUPTURE, UNSPECIFIED (CMS-HCC): Status: ACTIVE | Noted: 2023-11-15

## 2024-01-26 ENCOUNTER — PHARMACY VISIT (OUTPATIENT)
Dept: PHARMACY | Facility: CLINIC | Age: 59
End: 2024-01-26
Payer: MEDICAID

## 2024-01-26 PROCEDURE — RXMED WILLOW AMBULATORY MEDICATION CHARGE

## 2024-04-10 ENCOUNTER — APPOINTMENT (OUTPATIENT)
Dept: RADIOLOGY | Facility: HOSPITAL | Age: 59
End: 2024-04-10
Payer: COMMERCIAL

## 2024-04-10 ENCOUNTER — HOSPITAL ENCOUNTER (OUTPATIENT)
Facility: HOSPITAL | Age: 59
Setting detail: OBSERVATION
Discharge: HOME | End: 2024-04-11
Attending: EMERGENCY MEDICINE | Admitting: STUDENT IN AN ORGANIZED HEALTH CARE EDUCATION/TRAINING PROGRAM
Payer: COMMERCIAL

## 2024-04-10 DIAGNOSIS — E11.649 TYPE 2 DIABETES MELLITUS WITH HYPOGLYCEMIA WITHOUT COMA, UNSPECIFIED WHETHER LONG TERM INSULIN USE (MULTI): ICD-10-CM

## 2024-04-10 DIAGNOSIS — S09.90XA INJURY OF HEAD, INITIAL ENCOUNTER: ICD-10-CM

## 2024-04-10 DIAGNOSIS — I95.9 HYPOTENSION, UNSPECIFIED HYPOTENSION TYPE: Primary | ICD-10-CM

## 2024-04-10 DIAGNOSIS — R55 SYNCOPE AND COLLAPSE: ICD-10-CM
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
## 2024-04-10 LAB
ALBUMIN SERPL BCP-MCNC: 3.5 G/DL (ref 3.4–5)
ALP SERPL-CCNC: 79 U/L (ref 33–120)
ALT SERPL W P-5'-P-CCNC: 20 U/L (ref 10–52)
ANION GAP BLDV CALCULATED.4IONS-SCNC: 13 MMOL/L (ref 10–25)
ANION GAP SERPL CALC-SCNC: 11 MMOL/L (ref 10–20)
APTT PPP: 28 SECONDS (ref 27–38)
AST SERPL W P-5'-P-CCNC: 18 U/L (ref 9–39)
BASE EXCESS BLDV CALC-SCNC: -0.9 MMOL/L (ref -2–3)
BILIRUB SERPL-MCNC: 0.5 MG/DL (ref 0–1.2)
BODY TEMPERATURE: 37 DEGREES CELSIUS
BUN SERPL-MCNC: 13 MG/DL (ref 6–23)
CA-I BLDV-SCNC: 1.22 MMOL/L (ref 1.1–1.33)
CALCIUM SERPL-MCNC: 8.9 MG/DL (ref 8.6–10.6)
CARDIAC TROPONIN I PNL SERPL HS: 16 NG/L (ref 0–53)
CHLORIDE BLDV-SCNC: 96 MMOL/L (ref 98–107)
CHLORIDE SERPL-SCNC: 100 MMOL/L (ref 98–107)
CK SERPL-CCNC: 98 U/L (ref 0–325)
CO2 SERPL-SCNC: 24 MMOL/L (ref 21–32)
CREAT SERPL-MCNC: 0.97 MG/DL (ref 0.5–1.3)
EGFRCR SERPLBLD CKD-EPI 2021: 90 ML/MIN/1.73M*2
ERYTHROCYTE [DISTWIDTH] IN BLOOD BY AUTOMATED COUNT: 11.9 % (ref 11.5–14.5)
FLUAV RNA RESP QL NAA+PROBE: NOT DETECTED
FLUBV RNA RESP QL NAA+PROBE: NOT DETECTED
GLUCOSE BLD MANUAL STRIP-MCNC: 270 MG/DL (ref 74–99)
GLUCOSE BLD MANUAL STRIP-MCNC: 322 MG/DL (ref 74–99)
GLUCOSE BLD MANUAL STRIP-MCNC: 423 MG/DL (ref 74–99)
GLUCOSE BLDV-MCNC: 488 MG/DL (ref 74–99)
GLUCOSE SERPL-MCNC: 469 MG/DL (ref 74–99)
HCO3 BLDV-SCNC: 25.4 MMOL/L (ref 22–26)
HCT VFR BLD AUTO: 40.4 % (ref 41–52)
HCT VFR BLD EST: 43 % (ref 41–52)
HGB BLD-MCNC: 14.4 G/DL (ref 13.5–17.5)
HGB BLDV-MCNC: 14.3 G/DL (ref 13.5–17.5)
HOLD SPECIMEN: NORMAL
HOLD SPECIMEN: NORMAL
INR PPP: 1.2 (ref 0.9–1.1)
LACTATE BLDV-SCNC: 2.7 MMOL/L (ref 0.4–2)
MAGNESIUM SERPL-MCNC: 1.54 MG/DL (ref 1.6–2.4)
MCH RBC QN AUTO: 31.3 PG (ref 26–34)
MCHC RBC AUTO-ENTMCNC: 35.6 G/DL (ref 32–36)
MCV RBC AUTO: 88 FL (ref 80–100)
NRBC BLD-RTO: 0 /100 WBCS (ref 0–0)
OXYHGB MFR BLDV: 66.1 % (ref 45–75)
PCO2 BLDV: 47 MM HG (ref 41–51)
PH BLDV: 7.34 PH (ref 7.33–7.43)
PLATELET # BLD AUTO: 149 X10*3/UL (ref 150–450)
PO2 BLDV: 42 MM HG (ref 35–45)
POTASSIUM BLDV-SCNC: 4.1 MMOL/L (ref 3.5–5.3)
POTASSIUM SERPL-SCNC: 4 MMOL/L (ref 3.5–5.3)
PROT SERPL-MCNC: 6.5 G/DL (ref 6.4–8.2)
PROTHROMBIN TIME: 13.2 SECONDS (ref 9.8–12.8)
RBC # BLD AUTO: 4.6 X10*6/UL (ref 4.5–5.9)
SAO2 % BLDV: 68 % (ref 45–75)
SARS-COV-2 RNA RESP QL NAA+PROBE: NOT DETECTED
SODIUM BLDV-SCNC: 130 MMOL/L (ref 136–145)
SODIUM SERPL-SCNC: 131 MMOL/L (ref 136–145)
WBC # BLD AUTO: 12.7 X10*3/UL (ref 4.4–11.3)

## 2024-04-10 PROCEDURE — 85027 COMPLETE CBC AUTOMATED: CPT

## 2024-04-10 PROCEDURE — 82947 ASSAY GLUCOSE BLOOD QUANT: CPT | Mod: 59

## 2024-04-10 PROCEDURE — 99291 CRITICAL CARE FIRST HOUR: CPT

## 2024-04-10 PROCEDURE — 2550000001 HC RX 255 CONTRASTS: Mod: SE | Performed by: EMERGENCY MEDICINE

## 2024-04-10 PROCEDURE — 2500000002 HC RX 250 W HCPCS SELF ADMINISTERED DRUGS (ALT 637 FOR MEDICARE OP, ALT 636 FOR OP/ED): Mod: SE | Performed by: PHYSICIAN ASSISTANT

## 2024-04-10 PROCEDURE — 84132 ASSAY OF SERUM POTASSIUM: CPT

## 2024-04-10 PROCEDURE — 74177 CT ABD & PELVIS W/CONTRAST: CPT

## 2024-04-10 PROCEDURE — 85610 PROTHROMBIN TIME: CPT

## 2024-04-10 PROCEDURE — 36415 COLL VENOUS BLD VENIPUNCTURE: CPT

## 2024-04-10 PROCEDURE — 83036 HEMOGLOBIN GLYCOSYLATED A1C: CPT

## 2024-04-10 PROCEDURE — G0378 HOSPITAL OBSERVATION PER HR: HCPCS

## 2024-04-10 PROCEDURE — 72125 CT NECK SPINE W/O DYE: CPT

## 2024-04-10 PROCEDURE — 84484 ASSAY OF TROPONIN QUANT: CPT

## 2024-04-10 PROCEDURE — 73552 X-RAY EXAM OF FEMUR 2/>: CPT | Mod: BILATERAL PROCEDURE | Performed by: RADIOLOGY

## 2024-04-10 PROCEDURE — 73564 X-RAY EXAM KNEE 4 OR MORE: CPT | Mod: 50

## 2024-04-10 PROCEDURE — 73564 X-RAY EXAM KNEE 4 OR MORE: CPT | Mod: BILATERAL PROCEDURE | Performed by: RADIOLOGY

## 2024-04-10 PROCEDURE — 71260 CT THORAX DX C+: CPT | Mod: RCN | Performed by: RADIOLOGY

## 2024-04-10 PROCEDURE — 72128 CT CHEST SPINE W/O DYE: CPT | Mod: RCN

## 2024-04-10 PROCEDURE — 73552 X-RAY EXAM OF FEMUR 2/>: CPT | Mod: 50

## 2024-04-10 PROCEDURE — 73590 X-RAY EXAM OF LOWER LEG: CPT | Mod: 50

## 2024-04-10 PROCEDURE — 90715 TDAP VACCINE 7 YRS/> IM: CPT | Mod: SE

## 2024-04-10 PROCEDURE — 96374 THER/PROPH/DIAG INJ IV PUSH: CPT

## 2024-04-10 PROCEDURE — 73590 X-RAY EXAM OF LOWER LEG: CPT | Mod: BILATERAL PROCEDURE | Performed by: RADIOLOGY

## 2024-04-10 PROCEDURE — 71046 X-RAY EXAM CHEST 2 VIEWS: CPT

## 2024-04-10 PROCEDURE — 2500000005 HC RX 250 GENERAL PHARMACY W/O HCPCS: Mod: SE

## 2024-04-10 PROCEDURE — 82550 ASSAY OF CK (CPK): CPT

## 2024-04-10 PROCEDURE — 2500000004 HC RX 250 GENERAL PHARMACY W/ HCPCS (ALT 636 FOR OP/ED): Mod: SE

## 2024-04-10 PROCEDURE — 83735 ASSAY OF MAGNESIUM: CPT

## 2024-04-10 PROCEDURE — 82947 ASSAY GLUCOSE BLOOD QUANT: CPT

## 2024-04-10 PROCEDURE — 73523 X-RAY EXAM HIPS BI 5/> VIEWS: CPT

## 2024-04-10 PROCEDURE — 70486 CT MAXILLOFACIAL W/O DYE: CPT

## 2024-04-10 PROCEDURE — 2500000001 HC RX 250 WO HCPCS SELF ADMINISTERED DRUGS (ALT 637 FOR MEDICARE OP): Mod: SE | Performed by: PHYSICIAN ASSISTANT

## 2024-04-10 PROCEDURE — 96361 HYDRATE IV INFUSION ADD-ON: CPT

## 2024-04-10 PROCEDURE — 73523 X-RAY EXAM HIPS BI 5/> VIEWS: CPT | Mod: BILATERAL PROCEDURE | Performed by: RADIOLOGY

## 2024-04-10 PROCEDURE — 72131 CT LUMBAR SPINE W/O DYE: CPT | Mod: RCN | Performed by: RADIOLOGY

## 2024-04-10 PROCEDURE — 70450 CT HEAD/BRAIN W/O DYE: CPT

## 2024-04-10 PROCEDURE — 76376 3D RENDER W/INTRP POSTPROCES: CPT

## 2024-04-10 PROCEDURE — 74177 CT ABD & PELVIS W/CONTRAST: CPT | Mod: RCN | Performed by: RADIOLOGY

## 2024-04-10 PROCEDURE — 87636 SARSCOV2 & INF A&B AMP PRB: CPT | Performed by: EMERGENCY MEDICINE

## 2024-04-10 PROCEDURE — 2500000001 HC RX 250 WO HCPCS SELF ADMINISTERED DRUGS (ALT 637 FOR MEDICARE OP): Mod: SE

## 2024-04-10 PROCEDURE — 99291 CRITICAL CARE FIRST HOUR: CPT | Performed by: EMERGENCY MEDICINE

## 2024-04-10 PROCEDURE — 71046 X-RAY EXAM CHEST 2 VIEWS: CPT | Mod: FOREIGN READ | Performed by: RADIOLOGY

## 2024-04-10 PROCEDURE — 72128 CT CHEST SPINE W/O DYE: CPT | Mod: RCN | Performed by: RADIOLOGY

## 2024-04-10 PROCEDURE — 90471 IMMUNIZATION ADMIN: CPT

## 2024-04-10 PROCEDURE — 72131 CT LUMBAR SPINE W/O DYE: CPT | Mod: RCN

## 2024-04-10 RX ORDER — BACITRACIN ZINC 500 UNIT/G
OINTMENT IN PACKET (EA) TOPICAL ONCE
Qty: 1 EACH | Refills: 0 | Status: COMPLETED | OUTPATIENT
Start: 2024-04-10 | End: 2024-04-10

## 2024-04-10 RX ORDER — ATORVASTATIN CALCIUM 80 MG/1
80 TABLET, FILM COATED ORAL DAILY
Status: DISCONTINUED | OUTPATIENT
Start: 2024-04-10 | End: 2024-04-11 | Stop reason: HOSPADM

## 2024-04-10 RX ORDER — DOXEPIN HYDROCHLORIDE 50 MG/1
50 CAPSULE ORAL NIGHTLY
Status: DISCONTINUED | OUTPATIENT
Start: 2024-04-10 | End: 2024-04-11 | Stop reason: HOSPADM

## 2024-04-10 RX ORDER — LITHIUM CARBONATE 300 MG/1
300 CAPSULE ORAL
Status: DISCONTINUED | OUTPATIENT
Start: 2024-04-10 | End: 2024-04-11 | Stop reason: HOSPADM

## 2024-04-10 RX ORDER — DULOXETIN HYDROCHLORIDE 30 MG/1
60 CAPSULE, DELAYED RELEASE ORAL DAILY
Status: DISCONTINUED | OUTPATIENT
Start: 2024-04-10 | End: 2024-04-11 | Stop reason: HOSPADM

## 2024-04-10 RX ORDER — PANTOPRAZOLE SODIUM 20 MG/1
20 TABLET, DELAYED RELEASE ORAL
Status: DISCONTINUED | OUTPATIENT
Start: 2024-04-11 | End: 2024-04-11 | Stop reason: HOSPADM

## 2024-04-10 RX ORDER — TRAZODONE HYDROCHLORIDE 50 MG/1
50 TABLET ORAL NIGHTLY
Status: DISCONTINUED | OUTPATIENT
Start: 2024-04-10 | End: 2024-04-11 | Stop reason: HOSPADM

## 2024-04-10 RX ORDER — METHOCARBAMOL 100 MG/ML
1000 INJECTION, SOLUTION INTRAMUSCULAR; INTRAVENOUS ONCE
Status: COMPLETED | OUTPATIENT
Start: 2024-04-10 | End: 2024-04-10

## 2024-04-10 RX ORDER — ARIPIPRAZOLE 5 MG/1
5 TABLET ORAL DAILY
Status: DISCONTINUED | OUTPATIENT
Start: 2024-04-10 | End: 2024-04-11 | Stop reason: HOSPADM

## 2024-04-10 RX ORDER — AMLODIPINE BESYLATE 10 MG/1
10 TABLET ORAL DAILY
Status: DISCONTINUED | OUTPATIENT
Start: 2024-04-10 | End: 2024-04-11 | Stop reason: HOSPADM

## 2024-04-10 RX ORDER — BUPROPION HYDROCHLORIDE 150 MG/1
300 TABLET ORAL DAILY
Status: DISCONTINUED | OUTPATIENT
Start: 2024-04-10 | End: 2024-04-11 | Stop reason: HOSPADM

## 2024-04-10 RX ORDER — LIDOCAINE 560 MG/1
1 PATCH PERCUTANEOUS; TOPICAL; TRANSDERMAL DAILY
Status: DISCONTINUED | OUTPATIENT
Start: 2024-04-10 | End: 2024-04-11 | Stop reason: HOSPADM

## 2024-04-10 RX ORDER — ALBUTEROL SULFATE 0.83 MG/ML
2.5 SOLUTION RESPIRATORY (INHALATION) EVERY 4 HOURS PRN
Status: DISCONTINUED | OUTPATIENT
Start: 2024-04-10 | End: 2024-04-11 | Stop reason: HOSPADM

## 2024-04-10 RX ORDER — ARIPIPRAZOLE 5 MG/1
10 TABLET ORAL DAILY
Status: DISCONTINUED | OUTPATIENT
Start: 2024-04-10 | End: 2024-04-10

## 2024-04-10 RX ORDER — LISINOPRIL 10 MG/1
10 TABLET ORAL DAILY
Status: DISCONTINUED | OUTPATIENT
Start: 2024-04-10 | End: 2024-04-11 | Stop reason: HOSPADM

## 2024-04-10 RX ORDER — ACETAMINOPHEN 325 MG/1
650 TABLET ORAL ONCE
Status: DISCONTINUED | OUTPATIENT
Start: 2024-04-10 | End: 2024-04-10

## 2024-04-10 RX ORDER — DEXTROSE 50 % IN WATER (D50W) INTRAVENOUS SYRINGE
12.5
Status: DISCONTINUED | OUTPATIENT
Start: 2024-04-10 | End: 2024-04-11 | Stop reason: HOSPADM

## 2024-04-10 RX ORDER — ASPIRIN 81 MG/1
81 TABLET ORAL DAILY
Status: DISCONTINUED | OUTPATIENT
Start: 2024-04-10 | End: 2024-04-11 | Stop reason: HOSPADM

## 2024-04-10 RX ORDER — DEXTROSE 50 % IN WATER (D50W) INTRAVENOUS SYRINGE
25
Status: DISCONTINUED | OUTPATIENT
Start: 2024-04-10 | End: 2024-04-11 | Stop reason: HOSPADM

## 2024-04-10 RX ORDER — ACETAMINOPHEN 325 MG/1
975 TABLET ORAL ONCE
Status: COMPLETED | OUTPATIENT
Start: 2024-04-10 | End: 2024-04-10

## 2024-04-10 RX ORDER — CLONIDINE HYDROCHLORIDE 0.1 MG/1
0.1 TABLET ORAL ONCE
Status: DISCONTINUED | OUTPATIENT
Start: 2024-04-10 | End: 2024-04-11 | Stop reason: HOSPADM

## 2024-04-10 RX ORDER — METFORMIN HYDROCHLORIDE 500 MG/1
500 TABLET ORAL
Status: DISCONTINUED | OUTPATIENT
Start: 2024-04-10 | End: 2024-04-11 | Stop reason: HOSPADM

## 2024-04-10 RX ADMIN — DOXEPIN HYDROCHLORIDE 50 MG: 50 CAPSULE ORAL at 20:00

## 2024-04-10 RX ADMIN — BACITRACIN 1 APPLICATION: 500 OINTMENT TOPICAL at 12:42

## 2024-04-10 RX ADMIN — ARIPIPRAZOLE 5 MG: 5 TABLET ORAL at 19:59

## 2024-04-10 RX ADMIN — IOHEXOL 100 ML: 350 INJECTION, SOLUTION INTRAVENOUS at 13:49

## 2024-04-10 RX ADMIN — DULOXETINE HYDROCHLORIDE 60 MG: 30 CAPSULE, DELAYED RELEASE ORAL at 18:55

## 2024-04-10 RX ADMIN — SODIUM CHLORIDE 1000 ML: 9 INJECTION, SOLUTION INTRAVENOUS at 10:47

## 2024-04-10 RX ADMIN — SODIUM CHLORIDE, POTASSIUM CHLORIDE, SODIUM LACTATE AND CALCIUM CHLORIDE 1000 ML: 600; 310; 30; 20 INJECTION, SOLUTION INTRAVENOUS at 19:50

## 2024-04-10 RX ADMIN — SODIUM CHLORIDE, POTASSIUM CHLORIDE, SODIUM LACTATE AND CALCIUM CHLORIDE 1000 ML: 600; 310; 30; 20 INJECTION, SOLUTION INTRAVENOUS at 11:40

## 2024-04-10 RX ADMIN — METHOCARBAMOL 1000 MG: 1000 INJECTION, SOLUTION INTRAMUSCULAR; INTRAVENOUS at 23:12

## 2024-04-10 RX ADMIN — METFORMIN HYDROCHLORIDE 500 MG: 500 TABLET ORAL at 18:55

## 2024-04-10 RX ADMIN — ATORVASTATIN CALCIUM 80 MG: 80 TABLET, FILM COATED ORAL at 18:54

## 2024-04-10 RX ADMIN — ACETAMINOPHEN 975 MG: 325 TABLET ORAL at 14:34

## 2024-04-10 RX ADMIN — LIDOCAINE 1 PATCH: 4 PATCH TOPICAL at 23:12

## 2024-04-10 RX ADMIN — LITHIUM CARBONATE 300 MG: 300 CAPSULE, GELATIN COATED ORAL at 19:54

## 2024-04-10 RX ADMIN — ASPIRIN 81 MG: 81 TABLET, COATED ORAL at 18:55

## 2024-04-10 RX ADMIN — TRAZODONE HYDROCHLORIDE 50 MG: 50 TABLET ORAL at 20:00

## 2024-04-10 RX ADMIN — TETANUS TOXOID, REDUCED DIPHTHERIA TOXOID AND ACELLULAR PERTUSSIS VACCINE, ADSORBED 0.5 ML: 5; 2.5; 8; 8; 2.5 SUSPENSION INTRAMUSCULAR at 12:42

## 2024-04-10 RX ADMIN — BUPROPION HYDROCHLORIDE 300 MG: 150 TABLET, EXTENDED RELEASE ORAL at 19:53

## 2024-04-10 ASSESSMENT — PAIN SCALES - GENERAL
PAINLEVEL_OUTOF10: 6
PAINLEVEL_OUTOF10: 8
PAINLEVEL_OUTOF10: 8

## 2024-04-10 ASSESSMENT — PAIN - FUNCTIONAL ASSESSMENT: PAIN_FUNCTIONAL_ASSESSMENT: 0-10

## 2024-04-10 ASSESSMENT — PAIN DESCRIPTION - PAIN TYPE: TYPE: ACUTE PAIN

## 2024-04-10 ASSESSMENT — PAIN DESCRIPTION - LOCATION
LOCATION: HEAD
LOCATION: EYE
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
## 2024-04-10 NOTE — ED PROVIDER NOTES
Emergency Medicine Transition of Care Note.    I received Christopher Saldivar in signout from Benson MOODY.  Please see the previous ED provider note for all HPI, PE and MDM up to the time of signout at 1500. This is in addition to the primary record.    In brief Christopher Saldivar is an 58 y.o. male presenting for   Chief Complaint   Patient presents with    Hypotension     At the time of signout we were awaiting: Admission for syncope workup.    ED Course as of 04/10/24 1802   Wed Apr 10, 2024   1456 XR tibia fibula bilateral 2 views [JS]   1603 Page placed to CDU for admission for syncope workup. [CL]   1613 Patient accepted to CDU for continued syncope workup. [CL]   1717 Emergency Medicine Supervising Resident Attestation:    Patient is a 58-year-old male with a past medical history of diabetes, hypertension, TIAs presenting to the emergency department originally with hypotension in the setting of a fall last night.  See previous [place] note for details of initial presentation.  In brief, patient was handed off to me pending final reads on his imaging which resulted after a CT pan scan showed no evidence of acute traumatic injury.  Labs showing no evidence of DKA despite significant hyperglycemia which may be contributing to the patient's case.  Mild hypomagnesemia unlikely to fully explain the patient's presentation.  No evidence of other anemia and troponin normal with EKG showing no evidence of acute ischemia.  Patient will be admitted to the CDU for further evaluation, diabetes educator, and further syncope evaluation and workup.  Patient hemodynamically stable at the time of disposition.    The patient was seen by the resident/fellow.  I have personally performed a substantive portion of the encounter.  I have seen and examined the patient; agree with the workup, evaluation, MDM, management and diagnosis.  The care plan has been discussed with the resident; I have reviewed the resident's note and agree with  the documented findings.      I independently interpreted patient's EKG and agree with the above mentioned interpretation.    William Ramirez MD  PGY3 Emergency Medicine   [DS]      ED Course User Index  [CL] Karri Cooley DO  [DS] William Ramirez MD  [JS] Benson Jackson, APRN-CNP         Diagnoses as of 04/10/24 1802   Hypotension, unspecified hypotension type   Fall, initial encounter   Injury of head, initial encounter       Medical Decision Making      Final diagnoses:   [I95.9] Hypotension, unspecified hypotension type   [W19.XXXA] Fall, initial encounter   [S09.90XA] Injury of head, initial encounter           Procedure  Procedures    DO Karri Ortega DO  Resident  04/10/24 1613       Karri Cooley DO  Resident  04/10/24 1802

## 2024-04-10 NOTE — ED PROCEDURE NOTE
Procedure  Critical Care    Performed by: Abhilash Harrison MD MPH  Authorized by: Abhilash Harrison MD MPH    Critical care provider statement:     Critical care time (minutes):  30    Critical care start time:  4/10/2024 10:35 AM    Critical care end time:  4/10/2024 11:14 AM    Critical care was necessary to treat or prevent imminent or life-threatening deterioration of the following conditions:  Trauma (hypotension, trauma)    Critical care was time spent personally by me on the following activities:  Blood draw for specimens, development of treatment plan with patient or surrogate, evaluation of patient's response to treatment, examination of patient, obtaining history from patient or surrogate, ordering and performing treatments and interventions, ordering and review of laboratory studies, ordering and review of radiographic studies, pulse oximetry, re-evaluation of patient's condition and review of old charts    Care discussed with: admitting provider                 Abhilash Harrison MD MPH  04/10/24 [id number]

## 2024-04-10 NOTE — H&P
"History and Physical  [facility] CLINICAL DECISION  Patient: Christopher Saldivar  MRN: [id number]  : 1965  Date of Evaluation: 04/10/24  CDU Provider: Hanna Howard PA-C    Patient History:  Christopher Saldivar is a 58 y.o. male who presents to the emergency department complaining of dizziness and headache following a fall on 24. He states that last night he was taking a shower and he slipped and fell on the cement. He believes he lost consciousness because he does not recall further details of the event. He noticed bruising around his R eye immediately after the fall and attempted to clean the wound near his eye with soap and water. He did not take any medications at home for his pain. Additionally, he is experiencing a headache posteriorly that is sharp and radiates to the back of his shoulders. He rates the headache as an 8/10. He states that he called 911 today because he continued to feel dizzy, had vision changes, and felt unsteady walking. He states that he has been having episodes of dizziness that lasts minutes multiple times a day x 6 months. He reports falling two other times in the past month and is unsure if he lost consciousness during those times. Additionally, the vision changes he has been experiencing have been going on x 1.5 months. He reports \"glossiness\" in his eyes that makes it difficult to see and white spots in his vision. He states that he wears glasses and was supposed to see an ophthalmologist for his symptoms, but has not had the chance to schedule an appt. Additional symptoms include back and bilateral LE pain. He states that he has been experiencing LE and back pain for a few yrs d/t a previous injury, but feels like the fall has made his pain worse. The back pain occurs at the lower midline and the pain in bilateral LE radiates down to the feet. He has noticed numbness/tingling in the lower extremities. He states that years ago he saw doctors at [facility], operated by [facility] for " these symptoms and was participating in PT. He also received injections in his back.       He also reports having flu-like symptoms such as rhinorrhea, headache, n/v, and loose stools. He states that the rhinorrhea and headache started 2 weeks ago. The soft stool that is green in coloration started 3 days ago. The n/v started 1.5 days ago. He reports two episodes total of non-bloody emesis. He is unsure if this is related to starting metformin about a week ago. Denies sore throat or acute SOB. He states that he feels SOB at night d/t requiring a CPAP. Denies previous surgical procedures or known allergies. Denies a hx of DVTs     The acute evaluation included:  Orders Placed This Encounter   Procedures    Critical Care    XR chest 2 views    CT head wo IV contrast    CT maxillofacial bones wo IV contrast    CT cervical spine wo IV contrast    CT thoracic spine wo IV contrast    CT lumbar spine wo IV contrast    CT chest abdomen pelvis w IV contrast    XR femur 2 VW bilateral    XR tibia fibula bilateral 2 views    XR knee 4+ views bilateral    XR hips bilateral 5+ VW w pelvis when performed    CBC    Comprehensive metabolic panel    BLOOD GAS VENOUS FULL PANEL    Magnesium    Creatine Kinase    Urinalysis with Reflex Microscopic    Coagulation Screen    Troponin Series, (0, 1 HR)    Troponin I, High Sensitivity, Initial    Troponin, High Sensitivity, 1 Hour    Blood Gas Lactic Acid, Venous    Extra Tubes    Lavender Top    Aly Top    Sars-CoV-2 and Influenza A/B PCR    Adult diet Regular    NPO Diet; Effective midnight    Notify provider (specify parameters)    Pain Assessment    No Isolation Required    Activity (specify) Ambulate    Vital Signs    Telemetry Monitoring    POCT GLUCOSE    POCT GLUCOSE    Electrocardiogram, 12-lead PRN ACS symptoms    Transthoracic Echo (TTE) Complete    Send to CDU    Initiate observation status       Past History   No past medical history on file.  No past surgical history on  file.  Social History     Socioeconomic History    Marital status:      Spouse name: Not on file    Number of children: Not on file    Years of education: Not on file    Highest education level: Not on file   Occupational History    Not on file   Tobacco Use    Smoking status: Former     Current packs/day: 0.00     Types: Cigarettes     Quit date:      Years since quittin.2    Smokeless tobacco: Never   Substance and Sexual Activity    Alcohol use: Never    Drug use: Never    Sexual activity: Not on file   Other Topics Concern    Not on file   Social History Narrative    Not on file     Social Determinants of Health     Financial Resource Strain: Not on File (10/3/2023)    Received from [facility]     Financial Resource Strain     Financial Resource Strain: 0   Food Insecurity: Not on File (10/3/2023)    Received from [facility]     Food Insecurity     Food: 0   Transportation Needs: Not on File (10/3/2023)    Received from [facility]     Transportation Needs     Transportation: 0   Physical Activity: Not on File (10/3/2023)    Received from [facility]     Physical Activity     Physical Activity: 0   Stress: Not on File (10/3/2023)    Received from [facility]     Stress     Stress: 0   Social Connections: Not on File (10/3/2023)    Received from [facility]     Social Connections     Social Connections and Isolation: 0   Intimate Partner Violence: Not on file   Housing Stability: Not on File (10/3/2023)    Received from [facility]     Housing Stability     Housin         Medications/Allergies     Previous Medications    ALBUTEROL 90 MCG/ACTUATION INHALER    TAKE 2 PUFFS by mouth EVERY 4 HOURS AS NEEDED FOR WHEEZING OR SHORTNESS OF BREATH    ALCOHOL PADS PADS, MEDICATED    USE AS DIRECTED 2-3 time DAILY    AMLODIPINE (NORVASC) 10 MG TABLET    Take 1 tablet (10 mg) by mouth once daily.    AMLODIPINE (NORVASC) 5 MG TABLET    Take 1 tablet (5 mg) by mouth once daily.    ARIPIPRAZOLE (ABILIFY) 10 MG TABLET    Take 1 tablet (10 mg) by  mouth once daily.    ASPIRIN 81 MG EC TABLET    Take 1 tablet (81 mg) by mouth once daily.    ATORVASTATIN (LIPITOR) 80 MG TABLET    Take 1 tablet (80 mg) by mouth once daily.    BLOOD PRESSURE MONITOR (BLOOD PRESSURE KIT) KIT    1 each by Does not apply route once daily.    BLOOD SUGAR DIAGNOSTIC STRIP    1 EACH 3 TIMES DAILY (BEFORE MEALS) INDICATIONS: DIABETES. DX: DM T2, CONTROLLED WITHOUT LONG-TERM INSULIN USE (E11.9).    BLOOD-GLUCOSE SENSOR (DEXCOM G7 SENSOR) DEVICE    Apply 1 sensor every 10 days for continuous glucose monitoring    BUPROPION XL (WELLBUTRIN XL) 300 MG 24 HR TABLET        CHOLECALCIFEROL (VITAMIN D-3) 25 MCG (1000 UT) TABLET    Take 1 tablet (25 mcg) by mouth once daily.    CICLOPIROX (PENLAC) 8 % SOLUTION    Apply topically.    CLONIDINE (CATAPRES) 0.1 MG TABLET    Take 1 tablet (0.1 mg) by mouth.    CYANOCOBALAMIN (VITAMIN B-12) 1,000 MCG TABLET    Take 1 tablet (1,000 mcg) by mouth once daily.    DEXCOM G4 PLATINUM  (DEXCOM G7 ) MISC    Use as instructed    DICLOFENAC SODIUM (VOLTAREN) 1 % GEL GEL    APPLY 2 GRAMS TO THE AFFECTED AREA 3 TIMES A DAY    DOXEPIN (SINEQUAN) 100 MG CAPSULE        DULAGLUTIDE (TRULICITY) 1.5 MG/0.5 ML PEN INJECTOR INJECTION    Inject 1.5 mg under the skin 1 (one) time per week.    DULOXETINE (CYMBALTA) 60 MG DR CAPSULE        EASY COMFORT LANCETS 30 GAUGE MISC    USE AS DIRECTED 2-3 times DAILY    EASY MINI EJECT LANCING DEVICE MISC    USE AS DIRECTED    EMPAGLIFLOZIN (JARDIANCE) 10 MG    Take 1 tablet (10 mg) by mouth once daily.    GABAPENTIN (NEURONTIN) 100 MG CAPSULE    Take 1 capsule (100 mg) by mouth 3 times a day.    HYDROXYZINE HCL (ATARAX) 25 MG TABLET    Take 1 tablet (25 mg) by mouth as needed at bedtime.    IBUPROFEN 200 MG TABLET    Take 3 tablets (600 mg) by mouth every 6 hours if needed.    IBUPROFEN 600 MG TABLET    Take 1 tablet (600 mg) by mouth 4 times a day as needed.    LIDOCAINE (LIDODERM) 5 % PATCH        LISINOPRIL 10 MG  "TABLET    Take 1 tablet (10 mg) by mouth once daily.    LISINOPRIL 10 MG TABLET    Take 1 tablet (10 mg) by mouth once daily.    LITHIUM 300 MG CAPSULE    Take 1 capsule (300 mg) by mouth 2 times a day with meals.    METFORMIN (GLUCOPHAGE) 500 MG TABLET    Take 2 tablets (1,000 mg) by mouth.    OMEPRAZOLE (PRILOSEC) 20 MG DR CAPSULE    Take 1 capsule (20 mg) by mouth once daily.    OMEPRAZOLE (PRILOSEC) 20 MG DR CAPSULE    Take 1 capsule (20 mg) by mouth once daily in the morning. Take before meals.    ONETOUCH ULTRA TEST STRIP    USE AS DIRECTED 2-3 times DAILY    ONETOUCH ULTRA2 METER MISC    USE AS DIRECTED    PRAZOSIN (MINIPRESS) 2 MG CAPSULE    Take 1 capsule (2 mg) by mouth once daily at bedtime.    QUETIAPINE (SEROQUEL) 100 MG TABLET        QUICKVUE AT-HOME COVID-19 TEST KIT        RISPERIDONE (RISPERDAL) 3 MG TABLET        TECHLITE PEN NEEDLE 31 GAUGE X 5/16\" NEEDLE        TOPIRAMATE (TOPAMAX) 25 MG TABLET    Take 1 tablet (25 mg) by mouth 2 times a day for 30 days, THEN 4 tablets (100 mg) 2 times a day.    TOPIRAMATE (TOPAMAX) 50 MG TABLET        TRAZODONE (DESYREL) 50 MG TABLET    1-2 tabs po at bedtime as needed for sleep    VICTOZA 2-ZULLY 0.6 MG/0.1 ML (18 MG/3 ML) INJECTION    inject 1.2 MG SUBCUTANEOUSLY ONCE DAILY     No Known Allergies    Review of Systems  All systems reviewed and otherwise negative, except as stated above in HPI.    Physical Exam     Visit Vitals  BP 98/65 (BP Location: Right arm, Patient Position: Lying)   Pulse 68   Temp 36.6 °C (97.8 °F) (Oral)   Resp 16   SpO2 97%   Smoking Status Former       Physical Exam:    VS: As documented in the triage note and EMR flowsheet from this visit were reviewed.    Appearance: Alert, oriented, cooperative, in no acute distress. Well nourished & well hydrated.    Skin: Warm, intact and dry. Periorbital ecchymosis about R eye with some associated edema - no erythema or increased warmth.    Eyes: PERRLA, EOMs intact. No pain with EOMs. No " nystagmus. Bilateral conjunctivae pink without injection.    ENT: Hearing grossly intact.  Nares patent, mucus membranes moist. Pharynx clear, uvula midline and non-edematous. No drooling, dysphagia or trismus. Voice non-muffled.    Neck: Wearing hard cervical collar. No obvious trauma to anterior neck.    Pulmonary: Clear bilaterally with good chest wall excursion. No rales, rhonchi or wheezing. No accessory muscle use or stridor.     Cardiac: Normal S1, S2.    Abdomen: Soft, nontender, active bowel sounds. Reducible umbilical hernia without overlying skin changes. No rebound or guarding. No CVA tenderness.    Musculoskeletal: Spontaneously moving all extremities. Extremities warm and well-perfused, capillary refill less than 2 seconds. Pulses full and equal. No extremity erythema, edema or increased warmth.     Neurological:  Cranial nerves II through XII are grossly intact, normal sensation, no weakness, no focal findings identified.     Psychiatric: Appropriate mood and affect. Kempt appearance.       Impression and Plan  In summary, Christopher Saldivar is admitted to the [facility] Clinical Decision Unit for syncope. Dr. Bolaños is the CDU admission attending.    This patient has been risk-stratified based on available history, physical exam, and related study findings. Admission to the observation status for further diagnosis/treatment/monitoring of syncope is warranted clinically. This extended period of observation is specifically required to determine the need for hospitalization.     The goals of this admission based on the patient's clinical problem list are:  1) continuous cardiac monitoring  2) serial troponin    We will observe the patient for the following endpoints:   1) NPO at midnight for AM echocardiogram  2) Tele monitoring    When met, appropriate disposition will be arranged.  Hanna Howard PA-C

## 2024-04-10 NOTE — ED PROVIDER NOTES
I performed a history and physical examination of Christopher Saldivar and discussed his management with Benson PANDEY.  I agree with the history, physical, assessment, and plan of care, with the following exceptions: None    I was present for the following procedures: None  Time Spent in Critical Care of the patient: 30  Time spent in discussions with the patient and family: 30    58-year-old male with a history of non-insulin-dependent diabetes, hypertension, hyperlipidemia, bipolar disorder, heart failure with preserved ejection fraction, known thoracic aortic aneurysm who presents after a fall last night, patient was also hypotensive with EMS  Patient said he felt dizzy described as lightheaded when he was getting out of the shower.  He sustained a fall with head trauma, unsure length of LOC.  Not on any anticoagulants. After about 30 minutes he was able to get himself up.  He came in today due to continued dizziness.  On ED arrival patient was awake alert oriented, blood pressures were 70s/50s but mentating well.  He had large right periorbital hematoma, extraocular movements are intact.  Clear to auscultation.  Tenderness to palpation in the left chest wall.  Abdomen was soft nontender.  He did have midline C, T and L-spine tenderness.  Moving all extremities spontaneously.  IV fluids were initiated.  Broad AMS workup as well as trauma pan scan was ordered.    Abhilash Harrison MD MPH       Abhilash Harrison MD MPH  04/10/24 1847

## 2024-04-10 NOTE — ED PROVIDER NOTES
"HPI   Chief Complaint   Patient presents with    Hypotension       58-year-old male with history of DM2 on metformin and Jardiance, HTN, HLD, bipolar/schizophrenia, reported TIA, substance use disorder with alcohol, and reported thoracic aortic aneurysm, presents to room 3 as a critical patient for chief complaint of fall with hypotension and loss of consciousness.  Reported falling last night after getting out of the shower.  He started to feel dizzy before getting out of the shower.  Described as a \"dizziness and lightheadedness, both with room spinning and lightheadedness.\"  States that he has not slept, but felt because of the symptoms in his head.  Unsure if he lost consciousness before or after falling.  Was on the ground for approximately 30 minutes he estimates before being able to get back up.  He called 911 this morning.  Noted to have hypertension by EMS on their arrival, so critical patient was called.  On arrival he is answering all questions appropriately.  Endorses taking all of his medications appropriately.  Endorses pain around his right eye with bruising and swelling.  Also endorses neck and generalized back pain.  Denies changes in urination or bowel movement.  Denies nausea or vomiting.  Denies chest pain or dyspnea.                          No data recorded                   Patient History   No past medical history on file.  No past surgical history on file.  No family history on file.  Social History     Tobacco Use    Smoking status: Former     Current packs/day: 0.00     Types: Cigarettes     Quit date:      Years since quittin.2    Smokeless tobacco: Never   Substance Use Topics    Alcohol use: Never    Drug use: Never       Physical Exam   ED Triage Vitals   Temperature Heart Rate Respirations BP   04/10/24 1034 04/10/24 1034 04/10/24 1034 04/10/24 1034   36.4 °C (97.6 °F) 84 14 81/66      Pulse Ox Temp src Heart Rate Source Patient Position   04/10/24 1034 -- 04/10/24 1043 -- "   95 %  Monitor       BP Location FiO2 (%)     -- 04/10/24 1034      21 %       Physical Exam  Constitutional:       Appearance: Normal appearance.   HENT:      Head: Normocephalic.      Comments: Right periorbital swelling and ecchymosis.  EOM intact.  PERRLA.     Mouth/Throat:      Mouth: Mucous membranes are moist.      Pharynx: Oropharynx is clear.   Eyes:      Extraocular Movements: Extraocular movements intact.      Conjunctiva/sclera: Conjunctivae normal.      Pupils: Pupils are equal, round, and reactive to light.   Cardiovascular:      Rate and Rhythm: Normal rate and regular rhythm.      Pulses: Normal pulses.      Heart sounds: Normal heart sounds.   Pulmonary:      Effort: Pulmonary effort is normal.      Breath sounds: Normal breath sounds.   Abdominal:      General: Abdomen is flat.      Palpations: Abdomen is soft.   Musculoskeletal:         General: Normal range of motion.      Cervical back: Normal range of motion and neck supple.      Comments: Midline C, T, L-spine tenderness without crepitus or deformity noted.  C-collar applied.  MSPs intact pre and post collar application.  Endorses tenderness over the general bilateral lower extremities, including the hips, femurs, and tip/fib areas.  No crepitus or deformity noted.  MSPs intact in all extremities.   Skin:     General: Skin is warm and dry.      Capillary Refill: Capillary refill takes less than 2 seconds.   Neurological:      General: No focal deficit present.      Mental Status: He is alert and oriented to person, place, and time.      GCS: GCS eye subscore is 4. GCS verbal subscore is 5. GCS motor subscore is 6.      Cranial Nerves: Cranial nerves 2-12 are intact.      Sensory: Sensation is intact.      Motor: Motor function is intact.      Coordination: Coordination is intact.   Psychiatric:         Mood and Affect: Mood normal.         Behavior: Behavior normal.         Thought Content: Thought content normal.         Judgment: Judgment  "normal.         ED Course & MDM   ED Course as of 04/10/24 1458   Wed Apr 10, 2024   1456 XR tibia fibula bilateral 2 views [JS]      ED Course User Index  [JS] Benson MARCELO Jackson, APRN-CNP         Diagnoses as of 04/10/24 1458   Hypotension, unspecified hypotension type   Fall, initial encounter   Injury of head, initial encounter       Medical Decision Making  58-year-old male with history of DM2 on metformin and Jardiance, HTN, HLD, bipolar/schizophrenia, reported TIA, substance use disorder with alcohol, and reported thoracic aortic aneurysm, presents to room 3 as a critical patient for chief complaint of fall with hypotension and loss of consciousness.  Reported falling last night after getting out of the shower.  He started to feel dizzy before getting out of the shower.  Described as a \"dizziness and lightheadedness, both with room spinning and lightheadedness.\"  States that he has not slept, but felt because of the symptoms in his head.  Unsure if he lost consciousness before or after falling.  Was on the ground for approximately 30 minutes he estimates before being able to get back up.  He called 911 this morning.  Noted to have hypertension by EMS on their arrival, so critical patient was called.  On arrival he is answering all questions appropriately.  Endorses taking all of his medications appropriately.  Endorses pain around his right eye with bruising and swelling.  Also endorses neck and generalized back pain. Vital signs reviewed, initially significant for severe hypotension around 81/66 and 73/61.  Not tachycardic.  Afebrile.  Answers all questions appropriately and does not appear to be in any distress.  Speaks full sentences without difficulty.  Fluids were started prior to arrival by EMS with normal saline and continued on arrival for 1 full liter.  Additional liter of LR was ordered.  After short time the patient blood pressure improved to 116/96.  Will continue to monitor.  Tylenol given for " symptom management with pain.  Wound above the right eye was cleaned and dressed with bacitracin.  Boostrix given for tetanus update.  Differential is wide open at this point.  Patient was pan scanned to rule out internal injury as described.    CT head showed no acute intracranial venous full panel showed elevated glucose at 48.  Lactate elevated at 2.7.  Multiple liters of fluid given.  CBC shows mild leukocytosis at 12.7 but otherwise unremarkable.  CMP also shows hyperglycemia at 469 with mild hyponatremia but otherwise unremarkable.  Flu AB as well as COVID not detected on PCR test.  Coagulation screen within normal ranges.  Magnesium slightly low at 1.54.  CK level 98 and unremarkable.  Troponin 16 and unremarkable.  On reevaluation the patient still endorses feeling improved since arrival. Findings.  CT C, T, L-spine showed no acute fractures or subluxation.  CT chest, abdomen, pelvis showed no acute intrathoracic or abdominal pelvic pathology.  X-rays of the bilateral knees, hips, tib-fib, femur, unremarkable.  Chest x-ray showed atelectasis that is mild but unremarkable.    On reevaluation the patient endorses still feeling well.  Because of the possible syncope, decision made to place possibly in CDU for observation and further workup.  Patient in agreement this plan.  Handed off to oncoming provider pending dispo.        Procedure  Procedures     Benson Jackson, GEETHA-CNP  04/10/24 1641

## 2024-04-11 ENCOUNTER — APPOINTMENT (OUTPATIENT)
Dept: CARDIOLOGY | Facility: HOSPITAL | Age: 59
End: 2024-04-11
Payer: COMMERCIAL

## 2024-04-11 ENCOUNTER — PHARMACY VISIT (OUTPATIENT)
Dept: PHARMACY | Facility: CLINIC | Age: 59
End: 2024-04-11
Payer: MEDICAID

## 2024-04-11 VITALS
TEMPERATURE: 97.6 F | RESPIRATION RATE: 18 BRPM | DIASTOLIC BLOOD PRESSURE: 87 MMHG | OXYGEN SATURATION: 94 % | SYSTOLIC BLOOD PRESSURE: 127 MMHG | HEART RATE: 91 BPM

## 2024-04-11 LAB
ANION GAP BLDV CALCULATED.4IONS-SCNC: 9 MMOL/L (ref 10–25)
AORTIC VALVE PEAK VELOCITY: 1.16 M/S
AV PEAK GRADIENT: 5.4 MMHG
AVA (PEAK VEL): 2.81 CM2
BASE EXCESS BLDV CALC-SCNC: 2 MMOL/L (ref -2–3)
BODY TEMPERATURE: 37 DEGREES CELSIUS
CA-I BLDV-SCNC: 1.19 MMOL/L (ref 1.1–1.33)
CHLORIDE BLDV-SCNC: 100 MMOL/L (ref 98–107)
EJECTION FRACTION APICAL 4 CHAMBER: 43.7
EST. AVERAGE GLUCOSE BLD GHB EST-MCNC: 306 MG/DL
GLUCOSE BLD MANUAL STRIP-MCNC: 198 MG/DL (ref 74–99)
GLUCOSE BLD MANUAL STRIP-MCNC: 229 MG/DL (ref 74–99)
GLUCOSE BLD MANUAL STRIP-MCNC: 245 MG/DL (ref 74–99)
GLUCOSE BLD MANUAL STRIP-MCNC: 278 MG/DL (ref 74–99)
GLUCOSE BLDV-MCNC: 301 MG/DL (ref 74–99)
HBA1C MFR BLD: 12.3 %
HCO3 BLDV-SCNC: 27.3 MMOL/L (ref 22–26)
HCT VFR BLD EST: 40 % (ref 41–52)
HGB BLDV-MCNC: 13.4 G/DL (ref 13.5–17.5)
INHALED O2 CONCENTRATION: 21 %
LACTATE BLDV-SCNC: 1.9 MMOL/L (ref 0.4–2)
LEFT VENTRICLE INTERNAL DIMENSION DIASTOLE: 5.1 CM (ref 3.5–6)
LEFT VENTRICULAR OUTFLOW TRACT DIAMETER: 2.1 CM
LV EJECTION FRACTION BIPLANE: 46 %
MITRAL VALVE E/A RATIO: 1.46
MITRAL VALVE E/E' RATIO: 8.98
OXYHGB MFR BLDV: 91.9 % (ref 45–75)
PCO2 BLDV: 44 MM HG (ref 41–51)
PH BLDV: 7.4 PH (ref 7.33–7.43)
PO2 BLDV: 66 MM HG (ref 35–45)
POTASSIUM BLDV-SCNC: 3.9 MMOL/L (ref 3.5–5.3)
RIGHT VENTRICLE FREE WALL PEAK S': 12.5 CM/S
SAO2 % BLDV: 95 % (ref 45–75)
SODIUM BLDV-SCNC: 132 MMOL/L (ref 136–145)
TRICUSPID ANNULAR PLANE SYSTOLIC EXCURSION: 2.4 CM

## 2024-04-11 PROCEDURE — 97162 PT EVAL MOD COMPLEX 30 MIN: CPT | Mod: GP | Performed by: PHYSICAL THERAPIST

## 2024-04-11 PROCEDURE — 96361 HYDRATE IV INFUSION ADD-ON: CPT

## 2024-04-11 PROCEDURE — 2500000002 HC RX 250 W HCPCS SELF ADMINISTERED DRUGS (ALT 637 FOR MEDICARE OP, ALT 636 FOR OP/ED): Mod: SE | Performed by: PHYSICIAN ASSISTANT

## 2024-04-11 PROCEDURE — 2500000004 HC RX 250 GENERAL PHARMACY W/ HCPCS (ALT 636 FOR OP/ED): Mod: SE | Performed by: PHYSICIAN ASSISTANT

## 2024-04-11 PROCEDURE — 93306 TTE W/DOPPLER COMPLETE: CPT | Performed by: INTERNAL MEDICINE

## 2024-04-11 PROCEDURE — 2500000004 HC RX 250 GENERAL PHARMACY W/ HCPCS (ALT 636 FOR OP/ED): Mod: SE

## 2024-04-11 PROCEDURE — 2500000001 HC RX 250 WO HCPCS SELF ADMINISTERED DRUGS (ALT 637 FOR MEDICARE OP): Mod: SE

## 2024-04-11 PROCEDURE — 82947 ASSAY GLUCOSE BLOOD QUANT: CPT

## 2024-04-11 PROCEDURE — G0378 HOSPITAL OBSERVATION PER HR: HCPCS

## 2024-04-11 PROCEDURE — 96375 TX/PRO/DX INJ NEW DRUG ADDON: CPT

## 2024-04-11 PROCEDURE — 2500000001 HC RX 250 WO HCPCS SELF ADMINISTERED DRUGS (ALT 637 FOR MEDICARE OP): Mod: SE | Performed by: PHYSICIAN ASSISTANT

## 2024-04-11 PROCEDURE — 93306 TTE W/DOPPLER COMPLETE: CPT

## 2024-04-11 PROCEDURE — RXMED WILLOW AMBULATORY MEDICATION CHARGE

## 2024-04-11 RX ORDER — DULAGLUTIDE 0.75 MG/.5ML
0.75 INJECTION, SOLUTION SUBCUTANEOUS
Qty: 2 ML | Refills: 0 | Status: SHIPPED | OUTPATIENT
Start: 2024-04-14

## 2024-04-11 RX ORDER — KETOROLAC TROMETHAMINE 15 MG/ML
15 INJECTION, SOLUTION INTRAMUSCULAR; INTRAVENOUS ONCE
Status: COMPLETED | OUTPATIENT
Start: 2024-04-11 | End: 2024-04-11

## 2024-04-11 RX ORDER — SODIUM CHLORIDE 9 MG/ML
125 INJECTION, SOLUTION INTRAVENOUS CONTINUOUS
Status: DISCONTINUED | OUTPATIENT
Start: 2024-04-11 | End: 2024-04-11 | Stop reason: HOSPADM

## 2024-04-11 RX ADMIN — PERFLUTREN 3 ML OF DILUTION: 6.52 INJECTION, SUSPENSION INTRAVENOUS at 09:54

## 2024-04-11 RX ADMIN — ATORVASTATIN CALCIUM 80 MG: 80 TABLET, FILM COATED ORAL at 10:29

## 2024-04-11 RX ADMIN — BUPROPION HYDROCHLORIDE 300 MG: 150 TABLET, EXTENDED RELEASE ORAL at 10:29

## 2024-04-11 RX ADMIN — SODIUM CHLORIDE 125 ML/HR: 9 INJECTION, SOLUTION INTRAVENOUS at 07:46

## 2024-04-11 RX ADMIN — SODIUM CHLORIDE 125 ML/HR: 9 INJECTION, SOLUTION INTRAVENOUS at 13:55

## 2024-04-11 RX ADMIN — LITHIUM CARBONATE 300 MG: 300 CAPSULE, GELATIN COATED ORAL at 10:29

## 2024-04-11 RX ADMIN — ASPIRIN 81 MG: 81 TABLET, COATED ORAL at 10:28

## 2024-04-11 RX ADMIN — DULOXETINE HYDROCHLORIDE 60 MG: 30 CAPSULE, DELAYED RELEASE ORAL at 10:29

## 2024-04-11 RX ADMIN — EMPAGLIFLOZIN 10 MG: 10 TABLET, FILM COATED ORAL at 10:29

## 2024-04-11 RX ADMIN — LITHIUM CARBONATE 300 MG: 300 CAPSULE, GELATIN COATED ORAL at 16:44

## 2024-04-11 RX ADMIN — LISINOPRIL 10 MG: 10 TABLET ORAL at 10:29

## 2024-04-11 RX ADMIN — KETOROLAC TROMETHAMINE 15 MG: 15 INJECTION, SOLUTION INTRAMUSCULAR; INTRAVENOUS at 07:46

## 2024-04-11 RX ADMIN — METFORMIN HYDROCHLORIDE 500 MG: 500 TABLET ORAL at 10:29

## 2024-04-11 RX ADMIN — SODIUM CHLORIDE 125 ML/HR: 9 INJECTION, SOLUTION INTRAVENOUS at 02:17

## 2024-04-11 RX ADMIN — ARIPIPRAZOLE 5 MG: 5 TABLET ORAL at 10:32

## 2024-04-11 RX ADMIN — AMLODIPINE BESYLATE 10 MG: 10 TABLET ORAL at 10:28

## 2024-04-11 ASSESSMENT — PAIN SCALES - GENERAL
PAINLEVEL_OUTOF10: 8
PAINLEVEL_OUTOF10: 0 - NO PAIN

## 2024-04-11 ASSESSMENT — COGNITIVE AND FUNCTIONAL STATUS - GENERAL
MOVING TO AND FROM BED TO CHAIR: A LITTLE
WALKING IN HOSPITAL ROOM: A LITTLE
STANDING UP FROM CHAIR USING ARMS: A LITTLE
MOBILITY SCORE: 20
CLIMB 3 TO 5 STEPS WITH RAILING: A LITTLE

## 2024-04-11 ASSESSMENT — PAIN - FUNCTIONAL ASSESSMENT
PAIN_FUNCTIONAL_ASSESSMENT: 0-10
PAIN_FUNCTIONAL_ASSESSMENT: 0-10

## 2024-04-11 NOTE — DISCHARGE SUMMARY
Disposition Note  Clinical Decision Unit   Patient: Christopher Saldivar  MRN: [id number]  : 1965  Encounter Date: 4/10/2024  CDU Provider: Austin Soriano PA-C      Subjective:    Christopher Saldivar is a 58 y.o. male has undergone comprehensive diagnostic evaluation and therapeutic management in accordance with the CDU guidelines for syncope. Based on  clinical response and diagnostic information during this period of observation, it has been determined that the patient will be discharged home.     This is a 58 year old male with a PMH of NIDDM, HTN, HLD, bipolar disorder, HFpEF, bipolar disorder who presented to the emergency department with concern for dizziness and lightheadedness, initially presented as a critical patient to the ED for chief complaint of fall and hypotension with loss of consciousness.    The patient was admitted to the CDU for further syncope workup.  He underwent a transthoracic echocardiogram without significant structural abnormalities noted.  EF was 50 to 55%.  His blood pressure improved with IV crystalloids, last /87.  The patient was able to ambulate independently with a stable gait.  He is stable for discharge home.  PCP follow-up advised.    Orders Placed This Encounter   Procedures    Critical Care    XR chest 2 views    CT head wo IV contrast    CT maxillofacial bones wo IV contrast    CT cervical spine wo IV contrast    CT thoracic spine wo IV contrast    CT lumbar spine wo IV contrast    CT chest abdomen pelvis w IV contrast    XR femur 2 VW bilateral    XR tibia fibula bilateral 2 views    XR knee 4+ views bilateral    XR hips bilateral 5+ VW w pelvis when performed    CBC    Comprehensive metabolic panel    BLOOD GAS VENOUS FULL PANEL    Magnesium    Creatine Kinase    Urinalysis with Reflex Microscopic    Coagulation Screen    Troponin Series, (0, 1 HR)    Troponin I, High Sensitivity, Initial    Troponin, High Sensitivity, 1 Hour    Blood Gas Lactic Acid, Venous    Extra Tubes     Lavender Top    Aly Top    Sars-CoV-2 and Influenza A/B PCR    Hemoglobin A1c    Blood Gas Venous Full Panel    Adult diet Carb Controlled; 60 gram carb/meal, 30 gram Carb evening snack    Notify provider (specify parameters)    Pain Assessment    No Isolation Required    Activity (specify) Ambulate    Vital Signs    Telemetry Monitoring    Nursing communication C-spine cleared. Please remove rigid c-collar    Notify provider (specify parameters)    Notify provider (specify parameters)    Notify provider (specify parameters)    Glucose 10-70 mg/dL & CONSCIOUS- Give 15 Grams of Carbohydrates and repeat until blood glucose level reaches 100 mg/dL or greater.    Notify provider (specify parameters)    Notify provider (specify parameters)    Notify provider (specify parameters)    PT eval and treat    POCT GLUCOSE    POCT GLUCOSE    POCT GLUCOSE    POCT GLUCOSE    POCT GLUCOSE    POCT GLUCOSE    POCT GLUCOSE    Electrocardiogram, 12-lead PRN ACS symptoms    Transthoracic Echo (TTE) Complete    Send to CDU    Initiate observation status       Results for orders placed or performed during the hospital encounter of 04/10/24   CBC   Result Value Ref Range    WBC 12.7 (H) 4.4 - 11.3 x10*3/uL    nRBC 0.0 0.0 - 0.0 /100 WBCs    RBC 4.60 4.50 - 5.90 x10*6/uL    Hemoglobin 14.4 13.5 - 17.5 g/dL    Hematocrit 40.4 (L) 41.0 - 52.0 %    MCV 88 80 - 100 fL    MCH 31.3 26.0 - 34.0 pg    MCHC 35.6 32.0 - 36.0 g/dL    RDW 11.9 11.5 - 14.5 %    Platelets 149 (L) 150 - 450 x10*3/uL   Comprehensive metabolic panel   Result Value Ref Range    Glucose 469 (HH) 74 - 99 mg/dL    Sodium 131 (L) 136 - 145 mmol/L    Potassium 4.0 3.5 - 5.3 mmol/L    Chloride 100 98 - 107 mmol/L    Bicarbonate 24 21 - 32 mmol/L    Anion Gap 11 10 - 20 mmol/L    Urea Nitrogen 13 6 - 23 mg/dL    Creatinine 0.97 0.50 - 1.30 mg/dL    eGFR 90 >60 mL/min/1.73m*2    Calcium 8.9 8.6 - 10.6 mg/dL    Albumin 3.5 3.4 - 5.0 g/dL    Alkaline Phosphatase 79 33 - 120 U/L     Total Protein 6.5 6.4 - 8.2 g/dL    AST 18 9 - 39 U/L    Bilirubin, Total 0.5 0.0 - 1.2 mg/dL    ALT 20 10 - 52 U/L   Magnesium   Result Value Ref Range    Magnesium 1.54 (L) 1.60 - 2.40 mg/dL   Creatine Kinase   Result Value Ref Range    Creatine Kinase 98 0 - 325 U/L   Coagulation Screen   Result Value Ref Range    Protime 13.2 (H) 9.8 - 12.8 seconds    INR 1.2 (H) 0.9 - 1.1    aPTT 28 27 - 38 seconds   Troponin I, High Sensitivity, Initial   Result Value Ref Range    Troponin I, High Sensitivity 16 0 - 53 ng/L   Lavender Top   Result Value Ref Range    Extra Tube Hold for add-ons.    Gray Top   Result Value Ref Range    Extra Tube Hold for add-ons.    Sars-CoV-2 and Influenza A/B PCR   Result Value Ref Range    Flu A Result Not Detected Not Detected    Flu B Result Not Detected Not Detected    Coronavirus 2019, PCR Not Detected Not Detected   Hemoglobin A1c   Result Value Ref Range    Hemoglobin A1C 12.3 (H) see below %    Estimated Average Glucose 306 Not Established mg/dL   Blood Gas Venous Full Panel   Result Value Ref Range    POCT pH, Venous 7.40 7.33 - 7.43 pH    POCT pCO2, Venous 44 41 - 51 mm Hg    POCT pO2, Venous 66 (H) 35 - 45 mm Hg    POCT SO2, Venous 95 (H) 45 - 75 %    POCT Oxy Hemoglobin, Venous 91.9 (H) 45.0 - 75.0 %    POCT Hematocrit Calculated, Venous 40.0 (L) 41.0 - 52.0 %    POCT Sodium, Venous 132 (L) 136 - 145 mmol/L    POCT Potassium, Venous 3.9 3.5 - 5.3 mmol/L    POCT Chloride, Venous 100 98 - 107 mmol/L    POCT Ionized Calicum, Venous 1.19 1.10 - 1.33 mmol/L    POCT Glucose, Venous 301 (H) 74 - 99 mg/dL    POCT Lactate, Venous 1.9 0.4 - 2.0 mmol/L    POCT Base Excess, Venous 2.0 -2.0 - 3.0 mmol/L    POCT HCO3 Calculated, Venous 27.3 (H) 22.0 - 26.0 mmol/L    POCT Hemoglobin, Venous 13.4 (L) 13.5 - 17.5 g/dL    POCT Anion Gap, Venous 9.0 (L) 10.0 - 25.0 mmol/L    Patient Temperature 37.0 degrees Celsius    FiO2 21 %   POCT GLUCOSE   Result Value Ref Range    POCT Glucose 423 (H) 74 - 99  mg/dL   POCT GLUCOSE   Result Value Ref Range    POCT Glucose 322 (H) 74 - 99 mg/dL   POCT GLUCOSE   Result Value Ref Range    POCT Glucose 270 (H) 74 - 99 mg/dL   POCT GLUCOSE   Result Value Ref Range    POCT Glucose 278 (H) 74 - 99 mg/dL   POCT GLUCOSE   Result Value Ref Range    POCT Glucose 229 (H) 74 - 99 mg/dL   POCT GLUCOSE   Result Value Ref Range    POCT Glucose 245 (H) 74 - 99 mg/dL   Transthoracic Echo (TTE) Complete   Result Value Ref Range    AV pk chyna 1.16 m/s    LVOT diam 2.10 cm    LV Biplane EF 46 %    MV avg E/e' ratio 8.98     MV E/A ratio 1.46     Tricuspid annular plane systolic excursion 2.4 cm    RV free wall pk S' 12.50 cm/s    LVIDd 5.10 cm    Aortic Valve Area by Continuity of Peak Velocity 2.81 cm2    AV pk grad 5.4 mmHg    LV A4C EF 43.7    Blood Gas Venous Full Panel Unsolicited   Result Value Ref Range    POCT pH, Venous 7.34 7.33 - 7.43 pH    POCT pCO2, Venous 47 41 - 51 mm Hg    POCT pO2, Venous 42 35 - 45 mm Hg    POCT SO2, Venous 68 45 - 75 %    POCT Oxy Hemoglobin, Venous 66.1 45.0 - 75.0 %    POCT Hematocrit Calculated, Venous 43.0 41.0 - 52.0 %    POCT Sodium, Venous 130 (L) 136 - 145 mmol/L    POCT Potassium, Venous 4.1 3.5 - 5.3 mmol/L    POCT Chloride, Venous 96 (L) 98 - 107 mmol/L    POCT Ionized Calicum, Venous 1.22 1.10 - 1.33 mmol/L    POCT Glucose, Venous 488 (HH) 74 - 99 mg/dL    POCT Lactate, Venous 2.7 (H) 0.4 - 2.0 mmol/L    POCT Base Excess, Venous -0.9 -2.0 - 3.0 mmol/L    POCT HCO3 Calculated, Venous 25.4 22.0 - 26.0 mmol/L    POCT Hemoglobin, Venous 14.3 13.5 - 17.5 g/dL    POCT Anion Gap, Venous 13.0 10.0 - 25.0 mmol/L    Patient Temperature 37.0 degrees Celsius            Past History   No past medical history on file.  No past surgical history on file.  Social History     Socioeconomic History    Marital status:      Spouse name: Not on file    Number of children: Not on file    Years of education: Not on file    Highest education level: Not on file    Occupational History    Not on file   Tobacco Use    Smoking status: Former     Current packs/day: 0.00     Types: Cigarettes     Quit date: 2013     Years since quittin.2    Smokeless tobacco: Never   Substance and Sexual Activity    Alcohol use: Never    Drug use: Never    Sexual activity: Not on file   Other Topics Concern    Not on file   Social History Narrative    Not on file     Social Determinants of Health     Financial Resource Strain: Not on File (10/3/2023)    Received from [facility]     Financial Resource Strain     Financial Resource Strain: 0   Food Insecurity: Not on File (10/3/2023)    Received from [facility]     Food Insecurity     Food: 0   Transportation Needs: Not on File (10/3/2023)    Received from [facility]     Transportation Needs     Transportation: 0   Physical Activity: Not on File (10/3/2023)    Received from [facility]     Physical Activity     Physical Activity: 0   Stress: Not on File (10/3/2023)    Received from [facility]     Stress     Stress: 0   Social Connections: Not on File (10/3/2023)    Received from [facility]     Social Connections     Social Connections and Isolation: 0   Intimate Partner Violence: Not on file   Housing Stability: Not on File (10/3/2023)    Received from [facility]     Housing Stability     Housin         Medications/Allergies     Previous Medications    ALBUTEROL 90 MCG/ACTUATION INHALER    TAKE 2 PUFFS by mouth EVERY 4 HOURS AS NEEDED FOR WHEEZING OR SHORTNESS OF BREATH    ALCOHOL PADS PADS, MEDICATED    USE AS DIRECTED 2-3 time DAILY    AMLODIPINE (NORVASC) 10 MG TABLET    Take 1 tablet (10 mg) by mouth once daily.    AMLODIPINE (NORVASC) 5 MG TABLET    Take 1 tablet (5 mg) by mouth once daily.    ARIPIPRAZOLE (ABILIFY) 10 MG TABLET    Take 1 tablet (10 mg) by mouth once daily.    ASPIRIN 81 MG EC TABLET    Take 1 tablet (81 mg) by mouth once daily.    ATORVASTATIN (LIPITOR) 80 MG TABLET    Take 1 tablet (80 mg) by mouth once daily.    BLOOD PRESSURE MONITOR (BLOOD  PRESSURE KIT) KIT    1 each by Does not apply route once daily.    BLOOD SUGAR DIAGNOSTIC STRIP    1 EACH 3 TIMES DAILY (BEFORE MEALS) INDICATIONS: DIABETES. DX: DM T2, CONTROLLED WITHOUT LONG-TERM INSULIN USE (E11.9).    BLOOD-GLUCOSE SENSOR (DEXCOM G7 SENSOR) DEVICE    Apply 1 sensor every 10 days for continuous glucose monitoring    BUPROPION XL (WELLBUTRIN XL) 300 MG 24 HR TABLET        CHOLECALCIFEROL (VITAMIN D-3) 25 MCG (1000 UT) TABLET    Take 1 tablet (25 mcg) by mouth once daily.    CICLOPIROX (PENLAC) 8 % SOLUTION    Apply topically.    CLONIDINE (CATAPRES) 0.1 MG TABLET    Take 1 tablet (0.1 mg) by mouth.    CYANOCOBALAMIN (VITAMIN B-12) 1,000 MCG TABLET    Take 1 tablet (1,000 mcg) by mouth once daily.    DEXCOM G4 PLATINUM  (DEXCOM G7 ) MISC    Use as instructed    DICLOFENAC SODIUM (VOLTAREN) 1 % GEL GEL    APPLY 2 GRAMS TO THE AFFECTED AREA 3 TIMES A DAY    DOXEPIN (SINEQUAN) 100 MG CAPSULE        DULAGLUTIDE (TRULICITY) 1.5 MG/0.5 ML PEN INJECTOR INJECTION    Inject 1.5 mg under the skin 1 (one) time per week.    DULOXETINE (CYMBALTA) 60 MG DR CAPSULE        EASY COMFORT LANCETS 30 GAUGE MISC    USE AS DIRECTED 2-3 times DAILY    EASY MINI EJECT LANCING DEVICE MISC    USE AS DIRECTED    EMPAGLIFLOZIN (JARDIANCE) 10 MG    Take 1 tablet (10 mg) by mouth once daily.    GABAPENTIN (NEURONTIN) 100 MG CAPSULE    Take 1 capsule (100 mg) by mouth 3 times a day.    HYDROXYZINE HCL (ATARAX) 25 MG TABLET    Take 1 tablet (25 mg) by mouth as needed at bedtime.    IBUPROFEN 200 MG TABLET    Take 3 tablets (600 mg) by mouth every 6 hours if needed.    IBUPROFEN 600 MG TABLET    Take 1 tablet (600 mg) by mouth 4 times a day as needed.    LIDOCAINE (LIDODERM) 5 % PATCH        LISINOPRIL 10 MG TABLET    Take 1 tablet (10 mg) by mouth once daily.    LISINOPRIL 10 MG TABLET    Take 1 tablet (10 mg) by mouth once daily.    LITHIUM 300 MG CAPSULE    Take 1 capsule (300 mg) by mouth 2 times a day with  "meals.    METFORMIN (GLUCOPHAGE) 500 MG TABLET    Take 2 tablets (1,000 mg) by mouth.    OMEPRAZOLE (PRILOSEC) 20 MG DR CAPSULE    Take 1 capsule (20 mg) by mouth once daily.    OMEPRAZOLE (PRILOSEC) 20 MG DR CAPSULE    Take 1 capsule (20 mg) by mouth once daily in the morning. Take before meals.    ONETOUCH ULTRA TEST STRIP    USE AS DIRECTED 2-3 times DAILY    ONETOUCH ULTRA2 METER MISC    USE AS DIRECTED    PRAZOSIN (MINIPRESS) 2 MG CAPSULE    Take 1 capsule (2 mg) by mouth once daily at bedtime.    QUETIAPINE (SEROQUEL) 100 MG TABLET        QUICKVUE AT-HOME COVID-19 TEST KIT        RISPERIDONE (RISPERDAL) 3 MG TABLET        TECHLITE PEN NEEDLE 31 GAUGE X 5/16\" NEEDLE        TOPIRAMATE (TOPAMAX) 25 MG TABLET    Take 1 tablet (25 mg) by mouth 2 times a day for 30 days, THEN 4 tablets (100 mg) 2 times a day.    TOPIRAMATE (TOPAMAX) 50 MG TABLET        TRAZODONE (DESYREL) 50 MG TABLET    1-2 tabs po at bedtime as needed for sleep    VICTOZA 2-ZULLY 0.6 MG/0.1 ML (18 MG/3 ML) INJECTION    inject 1.2 MG SUBCUTANEOUSLY ONCE DAILY     No Known Allergies      Review of Systems  All systems reviewed and otherwise negative, except as stated above in HPI.    Diagnostics reviewed by Austin Soriano PA-C     Labs:  Results for orders placed or performed during the hospital encounter of 04/10/24   CBC   Result Value Ref Range    WBC 12.7 (H) 4.4 - 11.3 x10*3/uL    nRBC 0.0 0.0 - 0.0 /100 WBCs    RBC 4.60 4.50 - 5.90 x10*6/uL    Hemoglobin 14.4 13.5 - 17.5 g/dL    Hematocrit 40.4 (L) 41.0 - 52.0 %    MCV 88 80 - 100 fL    MCH 31.3 26.0 - 34.0 pg    MCHC 35.6 32.0 - 36.0 g/dL    RDW 11.9 11.5 - 14.5 %    Platelets 149 (L) 150 - 450 x10*3/uL   Comprehensive metabolic panel   Result Value Ref Range    Glucose 469 (HH) 74 - 99 mg/dL    Sodium 131 (L) 136 - 145 mmol/L    Potassium 4.0 3.5 - 5.3 mmol/L    Chloride 100 98 - 107 mmol/L    Bicarbonate 24 21 - 32 mmol/L    Anion Gap 11 10 - 20 mmol/L    Urea Nitrogen 13 6 - 23 mg/dL    " Creatinine 0.97 0.50 - 1.30 mg/dL    eGFR 90 >60 mL/min/1.73m*2    Calcium 8.9 8.6 - 10.6 mg/dL    Albumin 3.5 3.4 - 5.0 g/dL    Alkaline Phosphatase 79 33 - 120 U/L    Total Protein 6.5 6.4 - 8.2 g/dL    AST 18 9 - 39 U/L    Bilirubin, Total 0.5 0.0 - 1.2 mg/dL    ALT 20 10 - 52 U/L   Magnesium   Result Value Ref Range    Magnesium 1.54 (L) 1.60 - 2.40 mg/dL   Creatine Kinase   Result Value Ref Range    Creatine Kinase 98 0 - 325 U/L   Coagulation Screen   Result Value Ref Range    Protime 13.2 (H) 9.8 - 12.8 seconds    INR 1.2 (H) 0.9 - 1.1    aPTT 28 27 - 38 seconds   Troponin I, High Sensitivity, Initial   Result Value Ref Range    Troponin I, High Sensitivity 16 0 - 53 ng/L   Lavender Top   Result Value Ref Range    Extra Tube Hold for add-ons.    Gray Top   Result Value Ref Range    Extra Tube Hold for add-ons.    Sars-CoV-2 and Influenza A/B PCR   Result Value Ref Range    Flu A Result Not Detected Not Detected    Flu B Result Not Detected Not Detected    Coronavirus 2019, PCR Not Detected Not Detected   Hemoglobin A1c   Result Value Ref Range    Hemoglobin A1C 12.3 (H) see below %    Estimated Average Glucose 306 Not Established mg/dL   Blood Gas Venous Full Panel   Result Value Ref Range    POCT pH, Venous 7.40 7.33 - 7.43 pH    POCT pCO2, Venous 44 41 - 51 mm Hg    POCT pO2, Venous 66 (H) 35 - 45 mm Hg    POCT SO2, Venous 95 (H) 45 - 75 %    POCT Oxy Hemoglobin, Venous 91.9 (H) 45.0 - 75.0 %    POCT Hematocrit Calculated, Venous 40.0 (L) 41.0 - 52.0 %    POCT Sodium, Venous 132 (L) 136 - 145 mmol/L    POCT Potassium, Venous 3.9 3.5 - 5.3 mmol/L    POCT Chloride, Venous 100 98 - 107 mmol/L    POCT Ionized Calicum, Venous 1.19 1.10 - 1.33 mmol/L    POCT Glucose, Venous 301 (H) 74 - 99 mg/dL    POCT Lactate, Venous 1.9 0.4 - 2.0 mmol/L    POCT Base Excess, Venous 2.0 -2.0 - 3.0 mmol/L    POCT HCO3 Calculated, Venous 27.3 (H) 22.0 - 26.0 mmol/L    POCT Hemoglobin, Venous 13.4 (L) 13.5 - 17.5 g/dL    POCT Anion  Gap, Venous 9.0 (L) 10.0 - 25.0 mmol/L    Patient Temperature 37.0 degrees Celsius    FiO2 21 %   POCT GLUCOSE   Result Value Ref Range    POCT Glucose 423 (H) 74 - 99 mg/dL   POCT GLUCOSE   Result Value Ref Range    POCT Glucose 322 (H) 74 - 99 mg/dL   POCT GLUCOSE   Result Value Ref Range    POCT Glucose 270 (H) 74 - 99 mg/dL   POCT GLUCOSE   Result Value Ref Range    POCT Glucose 278 (H) 74 - 99 mg/dL   POCT GLUCOSE   Result Value Ref Range    POCT Glucose 229 (H) 74 - 99 mg/dL   POCT GLUCOSE   Result Value Ref Range    POCT Glucose 245 (H) 74 - 99 mg/dL   Transthoracic Echo (TTE) Complete   Result Value Ref Range    AV pk chyna 1.16 m/s    LVOT diam 2.10 cm    LV Biplane EF 46 %    MV avg E/e' ratio 8.98     MV E/A ratio 1.46     Tricuspid annular plane systolic excursion 2.4 cm    RV free wall pk S' 12.50 cm/s    LVIDd 5.10 cm    Aortic Valve Area by Continuity of Peak Velocity 2.81 cm2    AV pk grad 5.4 mmHg    LV A4C EF 43.7    Blood Gas Venous Full Panel Unsolicited   Result Value Ref Range    POCT pH, Venous 7.34 7.33 - 7.43 pH    POCT pCO2, Venous 47 41 - 51 mm Hg    POCT pO2, Venous 42 35 - 45 mm Hg    POCT SO2, Venous 68 45 - 75 %    POCT Oxy Hemoglobin, Venous 66.1 45.0 - 75.0 %    POCT Hematocrit Calculated, Venous 43.0 41.0 - 52.0 %    POCT Sodium, Venous 130 (L) 136 - 145 mmol/L    POCT Potassium, Venous 4.1 3.5 - 5.3 mmol/L    POCT Chloride, Venous 96 (L) 98 - 107 mmol/L    POCT Ionized Calicum, Venous 1.22 1.10 - 1.33 mmol/L    POCT Glucose, Venous 488 (HH) 74 - 99 mg/dL    POCT Lactate, Venous 2.7 (H) 0.4 - 2.0 mmol/L    POCT Base Excess, Venous -0.9 -2.0 - 3.0 mmol/L    POCT HCO3 Calculated, Venous 25.4 22.0 - 26.0 mmol/L    POCT Hemoglobin, Venous 14.3 13.5 - 17.5 g/dL    POCT Anion Gap, Venous 13.0 10.0 - 25.0 mmol/L    Patient Temperature 37.0 degrees Celsius     Radiographs:  Transthoracic Echo (TTE) Complete   Final Result      CT chest abdomen pelvis w IV contrast   Final Result   1.  No  acute posttraumatic findings.   2.  Hepatic steatosis is suspected.   Signed by Benson Todd II, MD      CT head wo IV contrast   Final Result   1. No acute intracranial hemorrhage or calvarial fracture.        2. A scalp/subgaleal contusion overlies the right frontal convexity   extending into the right periorbital and preseptal tissues.        3. Suspected nasal bone fractures, probably chronic but recommend   correlation with direct palpation. No other facial bone fracture is   identified.        4. No acute fracture or traumatic subluxation of the cervical spine.   Lucency within the left superior articular process at C6 is favored   artifactual. Recommend correlation with direct palpation as   evaluation is somewhat degraded due to photon starvation due to   patient's body habitus.        I personally reviewed the images/study and I agree with the findings   as stated by Dr. Pruett.        MACRO:   None        Signed by: Edwin Peoples 4/10/2024 2:47 PM   Dictation workstation:   [id number]      CT maxillofacial bones wo IV contrast   Final Result   1. No acute intracranial hemorrhage or calvarial fracture.        2. A scalp/subgaleal contusion overlies the right frontal convexity   extending into the right periorbital and preseptal tissues.        3. Suspected nasal bone fractures, probably chronic but recommend   correlation with direct palpation. No other facial bone fracture is   identified.        4. No acute fracture or traumatic subluxation of the cervical spine.   Lucency within the left superior articular process at C6 is favored   artifactual. Recommend correlation with direct palpation as   evaluation is somewhat degraded due to photon starvation due to   patient's body habitus.        I personally reviewed the images/study and I agree with the findings   as stated by Dr. Pruett.        MACRO:   None        Signed by: Edwin Peoples 4/10/2024 2:47 PM   Dictation workstation:   [id number]       CT cervical spine wo IV contrast   Final Result   1. No acute intracranial hemorrhage or calvarial fracture.        2. A scalp/subgaleal contusion overlies the right frontal convexity   extending into the right periorbital and preseptal tissues.        3. Suspected nasal bone fractures, probably chronic but recommend   correlation with direct palpation. No other facial bone fracture is   identified.        4. No acute fracture or traumatic subluxation of the cervical spine.   Lucency within the left superior articular process at C6 is favored   artifactual. Recommend correlation with direct palpation as   evaluation is somewhat degraded due to photon starvation due to   patient's body habitus.        I personally reviewed the images/study and I agree with the findings   as stated by Dr. Pruett.        MACRO:   None        Signed by: Edwin Peoples 4/10/2024 2:47 PM   Dictation workstation:   [id number]      CT thoracic spine wo IV contrast   Final Result   1.  No acute posttraumatic findings.   2.  Hepatic steatosis is suspected.   Signed by Benson Todd II, MD      CT lumbar spine wo IV contrast   Final Result   1.  No acute posttraumatic findings.   2.  Hepatic steatosis is suspected.   Signed by Benson Todd II, MD      XR chest 2 views   Final Result   Right basilar subsegmental atelectasis.   Signed by Bony Rodríguez MD      XR femur 2 VW bilateral   Final Result   No acute osseous abnormalities.   Signed by Bony Rodríguez MD      XR tibia fibula bilateral 2 views   Final Result   No acute osseous abnormalities.   Signed by Bony Rodríguez MD      XR knee 4+ views bilateral   Final Result   No acute osseous abnormality.   Signed by Benson Richardson MD      XR hips bilateral 5+ VW w pelvis when performed   Final Result   No acute bony abnormality.   Signed by Jaxon Villeda MD              Physical Exam     Visit Vitals  /80 (BP Location: Right arm, Patient Position: Lying) Comment: 126/84 sitting 127/109  standing   Pulse 95 Comment: 96 in sitting, 96 in standing   Temp 36.4 °C (97.6 °F)   Resp 18   SpO2 96%   Smoking Status Former       GENERAL:  The patient appears nourished and normally developed. Vital signs as documented.     HEENT:  Head normocephalic, atraumatic, EOMs intact, PERRLA, Mucous membranes moist. Nares patent without copious rhinorrhea.  No lymphadenopathy.    PULMONARY:  Lungs are clear to auscultation, without any respiratory distress. Able to speak full sentences, no accessory muscle use    CARDIAC:   Normal rate. No murmurs, rubs or gallops    ABDOMEN:  Soft, non-distended, non-tender, BS positive x 4 quadrants, No rebound or guarding, no peritoneal signs, no CVA tenderness, no masses or organomegaly    MUSCULOSKELETAL:   Able to ambulate, Non edematous, with no obvious deformities. Pulses intact distal    SKIN:   Good color, with no significant rashes.  No pallor.    NEURO:  No obvious neurological deficits, normal sensation and strength bilaterally.  Able to follow commands.    Psych: Appropriate mood and affect    Consultants  1) N/A      Impression and Plan    In summary, Christopher Saldivar has been cared for according to the standard [facility] Clinical Decision Unit observation protocol for Syncope, unspecified syncope type. This extended period of observation was specifically required to determine the need for hospitalization. Prior to discharge from observation, the final physical exam is documented above. I have reviewed the results of the labs and imaging that were performed in the ED as well as the CDU.      Significant events during the course of observation based on the goals of the clinical problem list include:   1) telemetry without concerning aberrancies  2) echocardiogram without significant abnormalities, EF 50 to 55%  3) ambulating independently without difficulties  4) with hyperglycemia reportedly home Trulicity has been on backorder.  We discussed  "with endocrinology, who recommended that we adjust his Trulicity from 1.5 mg dosage to the 0.75 mg dosage as this may be available.  Meds to bed successfully done and the patient was given a Trulicity kit prior to discharge home    Based on the patient's condition and test results, the patient will be discharged home.    Date and Time of Disposition.   Discharge: 4/11/2024 1500  Admit: 4/10/2024 1754    Dr. Romano is the CDU disposition attending.      Discharge Diagnosis  Syncope, unspecified syncope type    Issues Requiring Follow-Up  N/A    Discharge Meds     Your medication list        CONTINUE taking these medications        Instructions Last Dose Given Next Dose Due   Alcohol Pads pads, medicated  Generic drug: alcohol swabs           Blood Pressure Kit kit  Generic drug: blood pressure monitor           Dexcom G7  misc  Generic drug: blood-glucose meter,continuous      Use as instructed       Dexcom G7 Sensor device  Generic drug: blood-glucose sensor      Apply 1 sensor every 10 days for continuous glucose monitoring       Easy Comfort Lancets 30 gauge misc  Generic drug: lancets           Easy Mini Eject Lancing Device misc  Generic drug: lancing device           OneTouch Ultra2 Meter misc  Generic drug: blood-glucose meter           TechLITE Pen Needle 31 gauge x 5/16\" needle  Generic drug: pen needle, diabetic                  ASK your doctor about these medications        Instructions Last Dose Given Next Dose Due   albuterol 90 mcg/actuation inhaler           amLODIPine 5 mg tablet  Commonly known as: Norvasc           amLODIPine 10 mg tablet  Commonly known as: Norvasc           ARIPiprazole 10 mg tablet  Commonly known as: Abilify      Take 1 tablet (10 mg) by mouth once daily.       aspirin 81 mg EC tablet           atorvastatin 80 mg tablet  Commonly known as: Lipitor      Take 1 tablet (80 mg) by mouth once daily.       blood sugar diagnostic strip           OneTouch Ultra Test " strip  Generic drug: blood sugar diagnostic           buPROPion  mg 24 hr tablet  Commonly known as: Wellbutrin XL           cholecalciferol 25 MCG (1000 UT) tablet  Commonly known as: Vitamin D-3           ciclopirox 8 % solution  Commonly known as: Penlac           cloNIDine 0.1 mg tablet  Commonly known as: Catapres           cyanocobalamin 1,000 mcg tablet  Commonly known as: Vitamin B-12           diclofenac sodium 1 % gel  Commonly known as: Voltaren           doxepin 100 mg capsule  Commonly known as: SINEquan           DULoxetine 60 mg DR capsule  Commonly known as: Cymbalta           gabapentin 100 mg capsule  Commonly known as: Neurontin           hydrOXYzine HCL 25 mg tablet  Commonly known as: Atarax           ibuprofen 600 mg tablet           ibuprofen 200 mg tablet           Jardiance 10 mg  Generic drug: empagliflozin      Take 1 tablet (10 mg) by mouth once daily.       lidocaine 5 % patch  Commonly known as: Lidoderm           lisinopril 10 mg tablet           lisinopril 10 mg tablet           lithium 300 mg capsule      Take 1 capsule (300 mg) by mouth 2 times a day with meals.       metFORMIN 500 mg tablet  Commonly known as: Glucophage           omeprazole 20 mg DR capsule  Commonly known as: PriLOSEC           omeprazole 20 mg DR capsule  Commonly known as: PriLOSEC           prazosin 2 mg capsule  Commonly known as: Minipress      Take 1 capsule (2 mg) by mouth once daily at bedtime.       QUEtiapine 100 mg tablet  Commonly known as: SEROquel           QuickVue At-Home COVID-19 Test kit  Generic drug: COVID-19 antigen test           risperiDONE 3 mg tablet  Commonly known as: RisperDAL           topiramate 25 mg tablet  Commonly known as: Topamax  Start taking on: November 10, 2023      Take 1 tablet (25 mg) by mouth 2 times a day for 30 days, THEN 4 tablets (100 mg) 2 times a day.       topiramate 50 mg tablet  Commonly known as: Topamax           traZODone 50 mg tablet  Commonly known  as: Desyrel      1-2 tabs po at bedtime as needed for sleep       Trulicity 1.5 mg/0.5 mL pen injector injection  Generic drug: dulaglutide      Inject 1.5 mg under the skin 1 (one) time per week.       Victoza 2-Anuj 0.6 mg/0.1 mL (18 mg/3 mL) injection  Generic drug: liraglutide                    Test Results Pending At Discharge  Pending Labs       Order Current Status    BLOOD GAS VENOUS FULL PANEL In process    Troponin Series, (0, 1 HR) In process            Outpatient Follow-Up  No future appointments.      Austin Soriano PA-C   Emergency Medicine/Clinical Decision Unit   [facility]
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
## 2024-04-11 NOTE — PROGRESS NOTES
Daily Progress Note  [facility] CLINICAL DECISION    Subjective  Christopher Saldivar has been admitted to the CDU for 12 hours. Serial assessments of the patient's clinical progress include:  Patient overall doing well and denies any worsening pain, vision changes, nausea, emesis, headache, abdominal pain. No chest pain/pressure, palpitations, increased SOB beyond his baseline.   On my evaluation, he reports  history of known PFO and was notably at [facility] last week for hyperglycemia. He endorsed temporary non-compliance with all DM medications (Metformin, Jardiance and Trulicity)  x 2 weeks due to facility staff not picking up his prescriptions from pharmacy. Reports just restarted taking Jardiance and Metformin yesterday but been able to begin take Trulicity because medication is on back order. Therefore, I supsect hyperglycemia related to non-compliance and will hold off on endo/DM educator consults at this time. He was also noted to be hypotensive on my initial evaluation with BP 89s/50s. He is currently asymptomatic and is not on blood thinners. BP normalized after 1L bolus of LR. Repeat BG also improved, down to 270s. Will defer insulin, continue to monitor VS and serial accu-checks and q4hrs.    UPDATE:   T2DM not well controlled likely 2/2 medication noncompliance. He is hyperglycemic with POC BG  ranging 270s-430s since arrival in ED. HgbA1c also trending worse from 4 weeks ago, up from 10.9 on 3/14/24 to 12.3. Lactate WNL on repeat VBG, which showed no metabolic derangements or evidence of DKA. Given gradual improvement in blood glucose after 1L bolus of LR and nightly metformin dosage, will resume home meds and continue IV LR at maintenance rate of 125ml/hour.       Objective  PHYSICAL EXAM:   Vital signs reviewed and noted no distress. Afebrile.  General: Patient is conversant, cooperative, well-appearing and well-nourished. NAD.  Head: Normocephalic without evidence of external trauma/injury.    Neck: (+) Rigid C-collar in place (subsequently removed. Supple, full ROM with minimal TTP over bilateral paraspinal musculature, reproducible with head rotation. Trachea is midline.   Eyes: PERRL, EOMI without scleral icterus. Conjunctiva clear without retained Fb's. (+) Periorbital ecchymosis and swelling surrounding right eye without warmth or erythema to overlying skin surroundings.  ENMT: Hearing grossly intact. MMM. Posterior oropharynx unremarkable. Normal phonation. No stridor, drooling or trismus.  Cardiac: Regular rate & rhythm. No murmur, gallops, rubs or extrasystole.  Pulmonary: CTAB with normal respiratory effort and good aeration throughout.   Abdomen: (+) Obese. Soft, non-tender, non-surgical with non-tender and easily reducible umbilical hernnia Normoactive BS x 4 quadrants.  MSK: Full Spontaneously ROM intact. Symmetric muscle bulk without step-offs or gross deformity.  Vascular: Distal pulses full and equal. No cyanosis, clubbing or pitting LE edema. Capillary refill < 2s  Skin: WWP. Intact without rashes, lesions or discoloration. Turgor is good.  Neuro: CN II-XII intact. Awake, A&Ox3. Speech is fluent. No focal deficits noted.  Psychiatric: Mood and affect appropriate for situation.    Diagnostic Evaluation:     Labs reviewed     Labs    Results from last 72 hours   Lab Units 04/10/24  1040   WBC AUTO x10*3/uL 12.7*   RBC AUTO x10*6/uL 4.60   HEMATOCRIT % 40.4*   MCV fL 88   MCH pg 31.3   RDW % 11.9   PLATELETS AUTO x10*3/uL 149*     Results from last 72 hours   Lab Units 04/10/24  1040   GLUCOSE mg/dL 469*   SODIUM mmol/L 131*   POTASSIUM mmol/L 4.0   CHLORIDE mmol/L 100   CO2 mmol/L 24   ANION GAP mmol/L 11   BUN mg/dL 13   CREATININE mg/dL 0.97   EGFR mL/min/1.73m*2 90   CALCIUM mg/dL 8.9   MAGNESIUM mg/dL 1.54*      Results from last 72 hours   Lab Units 04/10/24  1040   ALK PHOS U/L 79   BILIRUBIN TOTAL mg/dL 0.5   PROTEIN TOTAL g/dL 6.5   ALT U/L 20   AST U/L 18     Results from last 72  hours   Lab Units 04/10/24  1040   APTT seconds 28   INR  1.2*       Results from last 72 hours   Lab Units 04/10/24  1040   TROPHS ng/L 16      Results from last 72 hours   Lab Units 04/11/24  0600 04/10/24  2332 04/10/24  2157 04/10/24  1310 04/10/24  1034   POCT GLUCOSE mg/dL 278*  --  270* 322* 423*   HEMOGLOBIN A1C %  --  12.3*  --   --   --       Results from last 72 hours   Lab Units 04/10/24  1038   POCT PH, VENOUS pH 7.34   POCT PCO2, VENOUS mm Hg 47   POCT PO2, VENOUS mm Hg 42   POCT SO2, VENOUS % 68   POCT HEMATOCRIT CALCULATED, VENOUS % 43.0   POCT SODIUM, VENOUS mmol/L 130*   POCT POTASSIUM, VENOUS mmol/L 4.1   POCT CHLORIDE, VENOUS mmol/L 96*   POCT IONIZED CALCIUM, VENOUS mmol/L 1.22   POCT GLUCOSE, VENOUS mg/dL 488*   POCT LACTATE, VENOUS mmol/L 2.7*   POCT BASE EXCESS, VENOUS mmol/L -0.9   POCT HCO3 CALCULATED, VENOUS mmol/L 25.4   POCT HEMOGLOBIN, VENOUS g/dL 14.3   POCT ANION GAP, VENOUS mmol/L 13.0         Urine                  Microbiology                Imaging    CT chest abdomen pelvis w IV contrast   Final Result   1.  No acute posttraumatic findings.   2.  Hepatic steatosis is suspected.   Signed by Benson Todd II, MD      CT head wo IV contrast   Final Result   1. No acute intracranial hemorrhage or calvarial fracture.        2. A scalp/subgaleal contusion overlies the right frontal convexity   extending into the right periorbital and preseptal tissues.        3. Suspected nasal bone fractures, probably chronic but recommend   correlation with direct palpation. No other facial bone fracture is   identified.        4. No acute fracture or traumatic subluxation of the cervical spine.   Lucency within the left superior articular process at C6 is favored   artifactual. Recommend correlation with direct palpation as   evaluation is somewhat degraded due to photon starvation due to   patient's body habitus.        I personally reviewed the images/study and I agree with the findings   as  stated by Dr. Pruett.        MACRO:   None        Signed by: Edwin Peoples 4/10/2024 2:47 PM   Dictation workstation:   [id number]      CT maxillofacial bones wo IV contrast   Final Result   1. No acute intracranial hemorrhage or calvarial fracture.        2. A scalp/subgaleal contusion overlies the right frontal convexity   extending into the right periorbital and preseptal tissues.        3. Suspected nasal bone fractures, probably chronic but recommend   correlation with direct palpation. No other facial bone fracture is   identified.        4. No acute fracture or traumatic subluxation of the cervical spine.   Lucency within the left superior articular process at C6 is favored   artifactual. Recommend correlation with direct palpation as   evaluation is somewhat degraded due to photon starvation due to   patient's body habitus.        I personally reviewed the images/study and I agree with the findings   as stated by Dr. Pruett.        MACRO:   None        Signed by: Edwin Peoples 4/10/2024 2:47 PM   Dictation workstation:   [id number]      CT cervical spine wo IV contrast   Final Result   1. No acute intracranial hemorrhage or calvarial fracture.        2. A scalp/subgaleal contusion overlies the right frontal convexity   extending into the right periorbital and preseptal tissues.        3. Suspected nasal bone fractures, probably chronic but recommend   correlation with direct palpation. No other facial bone fracture is   identified.        4. No acute fracture or traumatic subluxation of the cervical spine.   Lucency within the left superior articular process at C6 is favored   artifactual. Recommend correlation with direct palpation as   evaluation is somewhat degraded due to photon starvation due to   patient's body habitus.        I personally reviewed the images/study and I agree with the findings   as stated by Dr. Pruett.        MACRO:   None        Signed by: Edwin Peoples 4/10/2024 2:47 PM    Dictation workstation:   [id number]      CT thoracic spine wo IV contrast   Final Result   1.  No acute posttraumatic findings.   2.  Hepatic steatosis is suspected.   Signed by Benson Todd II, MD      CT lumbar spine wo IV contrast   Final Result   1.  No acute posttraumatic findings.   2.  Hepatic steatosis is suspected.   Signed by Benson Todd II, MD      XR chest 2 views   Final Result   Right basilar subsegmental atelectasis.   Signed by Bony Rodríguez MD      XR femur 2 VW bilateral   Final Result   No acute osseous abnormalities.   Signed by Bony Rodríguez MD      XR tibia fibula bilateral 2 views   Final Result   No acute osseous abnormalities.   Signed by Bony Rodríguez MD      XR knee 4+ views bilateral   Final Result   No acute osseous abnormality.   Signed by Benson Richardson MD      XR hips bilateral 5+ VW w pelvis when performed   Final Result   No acute bony abnormality.   Signed by Jaxon Villeda MD      Transthoracic Echo (TTE) Complete    (Results Pending)            Medications:     Scheduled medications      - amLODIPine, 10 mg, oral, Daily  ARIPiprazole, 5 mg, oral, Daily  aspirin, 81 mg, oral, Daily  atorvastatin, 80 mg, oral, Daily  buPROPion XL, 300 mg, oral, Daily  cloNIDine, 0.1 mg, oral, Once  doxepin, 50 mg, oral, Nightly  DULoxetine, 60 mg, oral, Daily  empagliflozin, 10 mg, oral, Daily  lidocaine, 1 patch, transdermal, Daily  lisinopril, 10 mg, oral, Daily  lithium, 300 mg, oral, BID with meals  metFORMIN, 500 mg, oral, BID with meals  pantoprazole, 20 mg, oral, Daily before breakfast  traZODone, 50 mg, oral, Nightly         Continuous medications      - sodium chloride 0.9%, 125 mL/hr, Last Rate: 125 mL/hr (04/11/24 0217)         PRN medications      - PRN medications: albuterol, dextrose, dextrose, glucagon, glucagon     Assessment & Plan  Christopher Saldivar continues to be managed in accordance with the CDU clinical guidelines for Syncope. An update of their clinical problem list included:      1) Syncope      Neck pain      -- NPO at midnight      -- TTE with bubble study in AM     -- Cardiac telemetry     -- Monitor vital signs and neuro checks q4 hrs.     -- Orthostatics     -- Pain control: Robaxin 1,000mg x1 + Lidocaine patch Q24hr     -- Neurology     2) Hypotension    -- VS noted low BP of 89s/50s at beginning of shift. Denied active symptoms.    -- Resolved after 1L Bolus of LR administered    -- Start IVF at 125ml/hr    -- TTE with bubble study in AM.    -- Monitor VS Q4hrs    -- Continuous cardiac telemetry    3) T2DM      Medication non-compliance      Elevated Lactate    -- Patient restarted metformin last week + Januvia yesterday; Trulicity still on back-order.    -- BG are still elevated at 270s-300s.  > Resume home metformin + Januvia dosage  > Continue serial BG checks Q4hrs, PRN  > Consider endo consult for initiation of insulin if not improving with meds    -- HbA1c ordered and trending worse, up from 10.9 to 12.3    -- Repeat VBG showed no DKA or metabolic derangement, lactate normal    -- UA pending collection    -- Hold diabetic diet; NPO and no caffeine at midnight    -- Resume home metformin + Januvia dosage      Joceline Yin PA-C  [facility]

## 2024-04-11 NOTE — PROGRESS NOTES
Christopher Saldivar is a 58 y.o. male on day 2 of admission presenting with Syncope, unspecified syncope type.    Assessment/Plan   Principal Problem:    Syncope, unspecified syncope type  Active Problems:    Syncope and collapse  Pt seen and evaluated by PT. They stated Pt has no PT needs on discharge. Pt reportedly resides a [facility]. Pt asking to talk with staff there prior to discharge. Call placed to [facility] [phone] ext [phone]. Message left Pt to be medically cleared. RAI asked MD to do meds to beds for discharge per consent of Pt. RAI gave PA number for GISSELLE Patton.  Pt can discharged in a lyft with [facility] or call can be placed to [place].     ROBB Chaney

## 2024-05-15 ENCOUNTER — HOSPITAL ENCOUNTER (EMERGENCY)
Facility: HOSPITAL | Age: 59
Discharge: HOME | End: 2024-05-16
Attending: STUDENT IN AN ORGANIZED HEALTH CARE EDUCATION/TRAINING PROGRAM
Payer: COMMERCIAL

## 2024-05-15 ENCOUNTER — APPOINTMENT (OUTPATIENT)
Dept: RADIOLOGY | Facility: HOSPITAL | Age: 59
End: 2024-05-15
Payer: COMMERCIAL

## 2024-05-15 DIAGNOSIS — R11.2 NAUSEA AND VOMITING, UNSPECIFIED VOMITING TYPE: ICD-10-CM

## 2024-05-15 DIAGNOSIS — R10.9 ABDOMINAL PAIN, UNSPECIFIED ABDOMINAL LOCATION: Primary | ICD-10-CM

## 2024-05-15 LAB
ALBUMIN SERPL BCP-MCNC: 4.7 G/DL (ref 3.4–5)
ALP SERPL-CCNC: 79 U/L (ref 33–120)
ALT SERPL W P-5'-P-CCNC: 29 U/L (ref 10–52)
ANION GAP BLDV CALCULATED.4IONS-SCNC: 9 MMOL/L (ref 10–25)
ANION GAP SERPL CALC-SCNC: 24 MMOL/L (ref 10–20)
APPEARANCE UR: CLEAR
AST SERPL W P-5'-P-CCNC: 36 U/L (ref 9–39)
BASE EXCESS BLDV CALC-SCNC: 3.4 MMOL/L (ref -2–3)
BASOPHILS # BLD AUTO: 0.04 X10*3/UL (ref 0–0.1)
BASOPHILS NFR BLD AUTO: 0.2 %
BILIRUB SERPL-MCNC: 0.7 MG/DL (ref 0–1.2)
BILIRUB UR STRIP.AUTO-MCNC: NEGATIVE MG/DL
BODY TEMPERATURE: 37 DEGREES CELSIUS
BUN SERPL-MCNC: 25 MG/DL (ref 6–23)
CA-I BLDV-SCNC: 1.13 MMOL/L (ref 1.1–1.33)
CALCIUM SERPL-MCNC: 9.9 MG/DL (ref 8.6–10.6)
CHLORIDE BLDV-SCNC: 98 MMOL/L (ref 98–107)
CHLORIDE SERPL-SCNC: 96 MMOL/L (ref 98–107)
CO2 SERPL-SCNC: 19 MMOL/L (ref 21–32)
COLOR UR: ABNORMAL
CREAT SERPL-MCNC: 1.07 MG/DL (ref 0.5–1.3)
EGFRCR SERPLBLD CKD-EPI 2021: 80 ML/MIN/1.73M*2
EOSINOPHIL # BLD AUTO: 0.07 X10*3/UL (ref 0–0.7)
EOSINOPHIL NFR BLD AUTO: 0.4 %
ERYTHROCYTE [DISTWIDTH] IN BLOOD BY AUTOMATED COUNT: 12.7 % (ref 11.5–14.5)
GLUCOSE BLD MANUAL STRIP-MCNC: 166 MG/DL (ref 74–99)
GLUCOSE BLDV-MCNC: 133 MG/DL (ref 74–99)
GLUCOSE SERPL-MCNC: 158 MG/DL (ref 74–99)
GLUCOSE UR STRIP.AUTO-MCNC: ABNORMAL MG/DL
HCO3 BLDV-SCNC: 30.1 MMOL/L (ref 22–26)
HCT VFR BLD AUTO: 50.2 % (ref 41–52)
HCT VFR BLD EST: 47 % (ref 41–52)
HGB BLD-MCNC: 17.4 G/DL (ref 13.5–17.5)
HGB BLDV-MCNC: 15.7 G/DL (ref 13.5–17.5)
IMM GRANULOCYTES # BLD AUTO: 0.13 X10*3/UL (ref 0–0.7)
IMM GRANULOCYTES NFR BLD AUTO: 0.8 % (ref 0–0.9)
INHALED O2 CONCENTRATION: 21 %
KETONES UR STRIP.AUTO-MCNC: ABNORMAL MG/DL
LACTATE BLDV-SCNC: 2.1 MMOL/L (ref 0.4–2)
LEUKOCYTE ESTERASE UR QL STRIP.AUTO: NEGATIVE
LIPASE SERPL-CCNC: 18 U/L (ref 9–82)
LYMPHOCYTES # BLD AUTO: 3.01 X10*3/UL (ref 1.2–4.8)
LYMPHOCYTES NFR BLD AUTO: 18.2 %
MCH RBC QN AUTO: 31.2 PG (ref 26–34)
MCHC RBC AUTO-ENTMCNC: 34.7 G/DL (ref 32–36)
MCV RBC AUTO: 90 FL (ref 80–100)
MONOCYTES # BLD AUTO: 1 X10*3/UL (ref 0.1–1)
MONOCYTES NFR BLD AUTO: 6 %
NEUTROPHILS # BLD AUTO: 12.31 X10*3/UL (ref 1.2–7.7)
NEUTROPHILS NFR BLD AUTO: 74.4 %
NITRITE UR QL STRIP.AUTO: NEGATIVE
NRBC BLD-RTO: 0 /100 WBCS (ref 0–0)
OXYHGB MFR BLDV: 35.9 % (ref 45–75)
PCO2 BLDV: 52 MM HG (ref 41–51)
PH BLDV: 7.37 PH (ref 7.33–7.43)
PH UR STRIP.AUTO: 5.5 [PH]
PLATELET # BLD AUTO: 215 X10*3/UL (ref 150–450)
PO2 BLDV: 34 MM HG (ref 35–45)
POTASSIUM BLDV-SCNC: 4.5 MMOL/L (ref 3.5–5.3)
POTASSIUM SERPL-SCNC: 5.1 MMOL/L (ref 3.5–5.3)
PROT SERPL-MCNC: 9.2 G/DL (ref 6.4–8.2)
PROT UR STRIP.AUTO-MCNC: ABNORMAL MG/DL
RBC # BLD AUTO: 5.58 X10*6/UL (ref 4.5–5.9)
RBC # UR STRIP.AUTO: NEGATIVE /UL
RBC #/AREA URNS AUTO: NORMAL /HPF
SAO2 % BLDV: 36 % (ref 45–75)
SODIUM BLDV-SCNC: 133 MMOL/L (ref 136–145)
SODIUM SERPL-SCNC: 134 MMOL/L (ref 136–145)
SP GR UR STRIP.AUTO: >1.05
UROBILINOGEN UR STRIP.AUTO-MCNC: NORMAL MG/DL
WBC # BLD AUTO: 16.6 X10*3/UL (ref 4.4–11.3)
WBC #/AREA URNS AUTO: NORMAL /HPF

## 2024-05-15 PROCEDURE — 36415 COLL VENOUS BLD VENIPUNCTURE: CPT | Performed by: PHYSICIAN ASSISTANT

## 2024-05-15 PROCEDURE — 82947 ASSAY GLUCOSE BLOOD QUANT: CPT | Mod: 59

## 2024-05-15 PROCEDURE — 74177 CT ABD & PELVIS W/CONTRAST: CPT | Performed by: RADIOLOGY

## 2024-05-15 PROCEDURE — 83690 ASSAY OF LIPASE: CPT | Performed by: PHYSICIAN ASSISTANT

## 2024-05-15 PROCEDURE — 2500000001 HC RX 250 WO HCPCS SELF ADMINISTERED DRUGS (ALT 637 FOR MEDICARE OP): Mod: SE | Performed by: STUDENT IN AN ORGANIZED HEALTH CARE EDUCATION/TRAINING PROGRAM

## 2024-05-15 PROCEDURE — C9113 INJ PANTOPRAZOLE SODIUM, VIA: HCPCS | Mod: SE | Performed by: STUDENT IN AN ORGANIZED HEALTH CARE EDUCATION/TRAINING PROGRAM

## 2024-05-15 PROCEDURE — 74177 CT ABD & PELVIS W/CONTRAST: CPT

## 2024-05-15 PROCEDURE — 80053 COMPREHEN METABOLIC PANEL: CPT | Performed by: PHYSICIAN ASSISTANT

## 2024-05-15 PROCEDURE — 2550000001 HC RX 255 CONTRASTS: Mod: SE | Performed by: STUDENT IN AN ORGANIZED HEALTH CARE EDUCATION/TRAINING PROGRAM

## 2024-05-15 PROCEDURE — 99285 EMERGENCY DEPT VISIT HI MDM: CPT | Performed by: STUDENT IN AN ORGANIZED HEALTH CARE EDUCATION/TRAINING PROGRAM

## 2024-05-15 PROCEDURE — 84132 ASSAY OF SERUM POTASSIUM: CPT | Performed by: STUDENT IN AN ORGANIZED HEALTH CARE EDUCATION/TRAINING PROGRAM

## 2024-05-15 PROCEDURE — 2500000004 HC RX 250 GENERAL PHARMACY W/ HCPCS (ALT 636 FOR OP/ED): Mod: SE | Performed by: STUDENT IN AN ORGANIZED HEALTH CARE EDUCATION/TRAINING PROGRAM

## 2024-05-15 PROCEDURE — 87075 CULTR BACTERIA EXCEPT BLOOD: CPT | Mod: 59 | Performed by: STUDENT IN AN ORGANIZED HEALTH CARE EDUCATION/TRAINING PROGRAM

## 2024-05-15 PROCEDURE — 96375 TX/PRO/DX INJ NEW DRUG ADDON: CPT

## 2024-05-15 PROCEDURE — 82947 ASSAY GLUCOSE BLOOD QUANT: CPT

## 2024-05-15 PROCEDURE — 87040 BLOOD CULTURE FOR BACTERIA: CPT | Performed by: STUDENT IN AN ORGANIZED HEALTH CARE EDUCATION/TRAINING PROGRAM

## 2024-05-15 PROCEDURE — 85025 COMPLETE CBC W/AUTO DIFF WBC: CPT | Performed by: PHYSICIAN ASSISTANT

## 2024-05-15 PROCEDURE — 81001 URINALYSIS AUTO W/SCOPE: CPT | Performed by: STUDENT IN AN ORGANIZED HEALTH CARE EDUCATION/TRAINING PROGRAM

## 2024-05-15 PROCEDURE — 96361 HYDRATE IV INFUSION ADD-ON: CPT

## 2024-05-15 PROCEDURE — 36415 COLL VENOUS BLD VENIPUNCTURE: CPT | Performed by: STUDENT IN AN ORGANIZED HEALTH CARE EDUCATION/TRAINING PROGRAM

## 2024-05-15 PROCEDURE — 99284 EMERGENCY DEPT VISIT MOD MDM: CPT | Mod: 25

## 2024-05-15 PROCEDURE — 96365 THER/PROPH/DIAG IV INF INIT: CPT | Mod: 59

## 2024-05-15 RX ORDER — SUCRALFATE 1 G/1
1 TABLET ORAL
Qty: 120 TABLET | Refills: 0 | Status: SHIPPED | OUTPATIENT
Start: 2024-05-15 | End: 2025-05-15

## 2024-05-15 RX ORDER — PANTOPRAZOLE SODIUM 20 MG/1
20 TABLET, DELAYED RELEASE ORAL DAILY
Qty: 20 TABLET | Refills: 0 | Status: SHIPPED | OUTPATIENT
Start: 2024-05-15 | End: 2024-06-04

## 2024-05-15 RX ORDER — ONDANSETRON 4 MG/1
4 TABLET, FILM COATED ORAL EVERY 8 HOURS PRN
Qty: 12 TABLET | Refills: 0 | Status: SHIPPED | OUTPATIENT
Start: 2024-05-15 | End: 2024-05-18

## 2024-05-15 RX ORDER — PANTOPRAZOLE SODIUM 40 MG/10ML
40 INJECTION, POWDER, LYOPHILIZED, FOR SOLUTION INTRAVENOUS ONCE
Status: COMPLETED | OUTPATIENT
Start: 2024-05-15 | End: 2024-05-15

## 2024-05-15 RX ORDER — ALUMINUM HYDROXIDE, MAGNESIUM HYDROXIDE, AND SIMETHICONE 1200; 120; 1200 MG/30ML; MG/30ML; MG/30ML
30 SUSPENSION ORAL ONCE
Status: COMPLETED | OUTPATIENT
Start: 2024-05-15 | End: 2024-05-15

## 2024-05-15 RX ORDER — DICYCLOMINE HYDROCHLORIDE 10 MG/1
20 CAPSULE ORAL ONCE
Status: COMPLETED | OUTPATIENT
Start: 2024-05-15 | End: 2024-05-15

## 2024-05-15 RX ORDER — METOCLOPRAMIDE HYDROCHLORIDE 5 MG/ML
5 INJECTION INTRAMUSCULAR; INTRAVENOUS ONCE
Status: COMPLETED | OUTPATIENT
Start: 2024-05-15 | End: 2024-05-15

## 2024-05-15 RX ORDER — ONDANSETRON 4 MG/1
4 TABLET, FILM COATED ORAL EVERY 6 HOURS
Qty: 12 TABLET | Refills: 0 | Status: SHIPPED | OUTPATIENT
Start: 2024-05-15 | End: 2024-05-15

## 2024-05-15 RX ORDER — ONDANSETRON HYDROCHLORIDE 2 MG/ML
4 INJECTION, SOLUTION INTRAVENOUS ONCE
Status: COMPLETED | OUTPATIENT
Start: 2024-05-15 | End: 2024-05-15

## 2024-05-15 RX ORDER — LIDOCAINE HYDROCHLORIDE 20 MG/ML
15 SOLUTION OROPHARYNGEAL ONCE
Status: COMPLETED | OUTPATIENT
Start: 2024-05-15 | End: 2024-05-15

## 2024-05-15 RX ORDER — DICYCLOMINE HYDROCHLORIDE 20 MG/1
20 TABLET ORAL 2 TIMES DAILY
Qty: 20 TABLET | Refills: 0 | Status: SHIPPED | OUTPATIENT
Start: 2024-05-15 | End: 2024-05-25

## 2024-05-15 RX ADMIN — ALUMINUM HYDROXIDE, MAGNESIUM HYDROXIDE, AND SIMETHICONE 30 ML: 200; 200; 20 SUSPENSION ORAL at 21:54

## 2024-05-15 RX ADMIN — PIPERACILLIN SODIUM AND TAZOBACTAM SODIUM 4.5 G: 4; .5 INJECTION, SOLUTION INTRAVENOUS at 17:48

## 2024-05-15 RX ADMIN — ONDANSETRON 4 MG: 2 INJECTION INTRAMUSCULAR; INTRAVENOUS at 17:38

## 2024-05-15 RX ADMIN — LIDOCAINE HYDROCHLORIDE 15 ML: 20 SOLUTION ORAL at 21:54

## 2024-05-15 RX ADMIN — IOHEXOL 90 ML: 350 INJECTION, SOLUTION INTRAVENOUS at 18:20

## 2024-05-15 RX ADMIN — METOCLOPRAMIDE 5 MG: 5 INJECTION, SOLUTION INTRAMUSCULAR; INTRAVENOUS at 23:39

## 2024-05-15 RX ADMIN — PANTOPRAZOLE SODIUM 40 MG: 40 INJECTION, POWDER, FOR SOLUTION INTRAVENOUS at 23:39

## 2024-05-15 RX ADMIN — SODIUM CHLORIDE, POTASSIUM CHLORIDE, SODIUM LACTATE AND CALCIUM CHLORIDE 2052 ML: 600; 310; 30; 20 INJECTION, SOLUTION INTRAVENOUS at 17:39

## 2024-05-15 RX ADMIN — DICYCLOMINE HYDROCHLORIDE 20 MG: 10 CAPSULE ORAL at 23:39

## 2024-05-15 ASSESSMENT — COLUMBIA-SUICIDE SEVERITY RATING SCALE - C-SSRS
2. HAVE YOU ACTUALLY HAD ANY THOUGHTS OF KILLING YOURSELF?: NO
6. HAVE YOU EVER DONE ANYTHING, STARTED TO DO ANYTHING, OR PREPARED TO DO ANYTHING TO END YOUR LIFE?: NO
1. IN THE PAST MONTH, HAVE YOU WISHED YOU WERE DEAD OR WISHED YOU COULD GO TO SLEEP AND NOT WAKE UP?: NO

## 2024-05-15 ASSESSMENT — PAIN SCALES - GENERAL
PAINLEVEL_OUTOF10: 5 - MODERATE PAIN
PAINLEVEL_OUTOF10: 0 - NO PAIN

## 2024-05-15 ASSESSMENT — PAIN DESCRIPTION - DESCRIPTORS: DESCRIPTORS: ACHING;CRAMPING

## 2024-05-15 ASSESSMENT — PAIN - FUNCTIONAL ASSESSMENT: PAIN_FUNCTIONAL_ASSESSMENT: 0-10

## 2024-05-15 ASSESSMENT — PAIN DESCRIPTION - LOCATION: LOCATION: ABDOMEN

## 2024-05-15 ASSESSMENT — PAIN DESCRIPTION - PAIN TYPE: TYPE: ACUTE PAIN

## 2024-05-15 NOTE — ED PROVIDER NOTES
EMERGENCY DEPARTMENT ENCOUNTER      Pt Name: Christopher Saldivar  MRN: [id number]  Birthdate 1965  Date of evaluation: 5/15/2024  Provider: Katie Gutierrez DO    CHIEF COMPLAINT       Chief Complaint   Patient presents with    Abdominal Pain         HISTORY OF PRESENT ILLNESS    HPI   58-year-old male presenting to the emergency department with 2 days of nausea and vomiting that is progressing to diffuse abdominal pain.  Patient has a history of prior alcohol abuse but has been sober for the last 8 months.  Was seen by his regular doctor yesterday, and was given mylicon, but has continued to vomit.  Denies fevers, chest pain, but states that he does have belly pain when he tries to take a deep breath.          Nursing Notes were reviewed.       PAST MEDICAL HISTORY   Patient History   History reviewed. No pertinent past medical history.      SURGICAL HISTORY     History reviewed. No pertinent surgical history.      CURRENT MEDICATIONS       Previous Medications    ALBUTEROL 90 MCG/ACTUATION INHALER    TAKE 2 PUFFS by mouth EVERY 4 HOURS AS NEEDED FOR WHEEZING OR SHORTNESS OF BREATH    ALCOHOL PADS PADS, MEDICATED    USE AS DIRECTED 2-3 time DAILY    AMLODIPINE (NORVASC) 10 MG TABLET    Take 1 tablet (10 mg) by mouth once daily.    AMLODIPINE (NORVASC) 5 MG TABLET    Take 1 tablet (5 mg) by mouth once daily.    ARIPIPRAZOLE (ABILIFY) 10 MG TABLET    Take 1 tablet (10 mg) by mouth once daily.    ASPIRIN 81 MG EC TABLET    Take 1 tablet (81 mg) by mouth once daily.    ATORVASTATIN (LIPITOR) 80 MG TABLET    Take 1 tablet (80 mg) by mouth once daily.    BLOOD PRESSURE MONITOR (BLOOD PRESSURE KIT) KIT    1 each by Does not apply route once daily.    BLOOD SUGAR DIAGNOSTIC STRIP    1 EACH 3 TIMES DAILY (BEFORE MEALS) INDICATIONS: DIABETES. DX: DM T2, CONTROLLED WITHOUT LONG-TERM INSULIN USE (E11.9).    BLOOD-GLUCOSE SENSOR (DEXCOM G7 SENSOR) DEVICE    Apply 1 sensor every 10 days for continuous glucose monitoring     BUPROPION XL (WELLBUTRIN XL) 300 MG 24 HR TABLET        CHOLECALCIFEROL (VITAMIN D-3) 25 MCG (1000 UT) TABLET    Take 1 tablet (25 mcg) by mouth once daily.    CICLOPIROX (PENLAC) 8 % SOLUTION    Apply topically.    CLONIDINE (CATAPRES) 0.1 MG TABLET    Take 1 tablet (0.1 mg) by mouth.    CYANOCOBALAMIN (VITAMIN B-12) 1,000 MCG TABLET    Take 1 tablet (1,000 mcg) by mouth once daily.    DEXCOM G4 PLATINUM  (DEXCOM G7 ) MISC    Use as instructed    DICLOFENAC SODIUM (VOLTAREN) 1 % GEL GEL    APPLY 2 GRAMS TO THE AFFECTED AREA 3 TIMES A DAY    DOXEPIN (SINEQUAN) 100 MG CAPSULE        DULAGLUTIDE (TRULICITY) 0.75 MG/0.5 ML PEN INJECTOR    Inject 0.75 mg under the skin 1 (one) time per week.    DULAGLUTIDE (TRULICITY) 1.5 MG/0.5 ML PEN INJECTOR INJECTION    Inject 1.5 mg under the skin 1 (one) time per week.    DULOXETINE (CYMBALTA) 60 MG DR CAPSULE        EASY COMFORT LANCETS 30 GAUGE MISC    USE AS DIRECTED 2-3 times DAILY    EASY MINI EJECT LANCING DEVICE MISC    USE AS DIRECTED    EMPAGLIFLOZIN (JARDIANCE) 10 MG    Take 1 tablet (10 mg) by mouth once daily.    GABAPENTIN (NEURONTIN) 100 MG CAPSULE    Take 1 capsule (100 mg) by mouth 3 times a day.    HYDROXYZINE HCL (ATARAX) 25 MG TABLET    Take 1 tablet (25 mg) by mouth as needed at bedtime.    IBUPROFEN 200 MG TABLET    Take 3 tablets (600 mg) by mouth every 6 hours if needed.    IBUPROFEN 600 MG TABLET    Take 1 tablet (600 mg) by mouth 4 times a day as needed.    LIDOCAINE (LIDODERM) 5 % PATCH        LISINOPRIL 10 MG TABLET    Take 1 tablet (10 mg) by mouth once daily.    LISINOPRIL 10 MG TABLET    Take 1 tablet (10 mg) by mouth once daily.    LITHIUM 300 MG CAPSULE    Take 1 capsule (300 mg) by mouth 2 times a day with meals.    METFORMIN (GLUCOPHAGE) 500 MG TABLET    Take 2 tablets (1,000 mg) by mouth.    OMEPRAZOLE (PRILOSEC) 20 MG DR CAPSULE    Take 1 capsule (20 mg) by mouth once daily.    OMEPRAZOLE (PRILOSEC) 20 MG DR CAPSULE    Take 1  "capsule (20 mg) by mouth once daily in the morning. Take before meals.    ONETOUCH ULTRA TEST STRIP    USE AS DIRECTED 2-3 times DAILY    ONETOUCH ULTRA2 METER MISC    USE AS DIRECTED    PRAZOSIN (MINIPRESS) 2 MG CAPSULE    Take 1 capsule (2 mg) by mouth once daily at bedtime.    QUETIAPINE (SEROQUEL) 100 MG TABLET        QUICKVUE AT-HOME COVID-19 TEST KIT        RISPERIDONE (RISPERDAL) 3 MG TABLET        TECHLITE PEN NEEDLE 31 GAUGE X 5/16\" NEEDLE        TOPIRAMATE (TOPAMAX) 25 MG TABLET    Take 1 tablet (25 mg) by mouth 2 times a day for 30 days, THEN 4 tablets (100 mg) 2 times a day.    TOPIRAMATE (TOPAMAX) 50 MG TABLET        TRAZODONE (DESYREL) 50 MG TABLET    1-2 tabs po at bedtime as needed for sleep    VICTOZA 2-ZULLY 0.6 MG/0.1 ML (18 MG/3 ML) INJECTION    inject 1.2 MG SUBCUTANEOUSLY ONCE DAILY       ALLERGIES     Patient has no known allergies.    FAMILY HISTORY     No family history on file.       SOCIAL HISTORY       Social History     Socioeconomic History    Marital status:      Spouse name: None    Number of children: None    Years of education: None    Highest education level: None   Occupational History    None   Tobacco Use    Smoking status: Former     Current packs/day: 0.00     Types: Cigarettes     Quit date:      Years since quittin.3    Smokeless tobacco: Never   Substance and Sexual Activity    Alcohol use: Not Currently     Comment: sober x7 months    Drug use: Never    Sexual activity: None   Other Topics Concern    None   Social History Narrative    None     Social Determinants of Health     Financial Resource Strain: Not on File (10/3/2023)    Received from [facility]     Financial Resource Strain     Financial Resource Strain: 0   Food Insecurity: Not on File (10/3/2023)    Received from [facility]     Food Insecurity     Food: 0   Transportation Needs: Not on File (10/3/2023)    Received from [facility]     Transportation Needs     Transportation: 0   Physical Activity: Not on File " (10/3/2023)    Received from [facility]     Physical Activity     Physical Activity: 0   Stress: Not on File (10/3/2023)    Received from [facility]     Stress     Stress: 0   Social Connections: Not on File (10/3/2023)    Received from [facility]     Social Connections     Social Connections and Isolation: 0   Intimate Partner Violence: Not on file   Housing Stability: Not on File (10/3/2023)    Received from [facility]     Housing Stability     Housin       SCREENINGS                             PHYSICAL EXAM    (up to 7 for level 4, 8 or more for level 5)   Physical Exam   ED Triage Vitals [05/15/24 1330]   Temperature Heart Rate Respirations BP   36.8 °C (98.2 °F) (!) 110 16 135/88      Pulse Ox Temp src Heart Rate Source Patient Position   96 % -- Monitor Sitting      BP Location FiO2 (%)     Right leg --       Physical Exam  Vitals and nursing note reviewed.   Constitutional:       General: He is not in acute distress.     Appearance: He is well-developed.   HENT:      Head: Normocephalic and atraumatic.   Eyes:      Conjunctiva/sclera: Conjunctivae normal.   Cardiovascular:      Rate and Rhythm: Regular rhythm. Tachycardia present.      Heart sounds: No murmur heard.  Pulmonary:      Effort: Pulmonary effort is normal. No respiratory distress.      Breath sounds: Normal breath sounds.   Abdominal:      Palpations: Abdomen is soft.      Tenderness: There is no abdominal tenderness.   Musculoskeletal:         General: No swelling.      Cervical back: Neck supple.   Skin:     General: Skin is warm and dry.      Capillary Refill: Capillary refill takes less than 2 seconds.   Neurological:      Mental Status: He is alert.   Psychiatric:         Mood and Affect: Mood normal.          DIAGNOSTIC RESULTS     LABS:  Labs Reviewed   CBC WITH AUTO DIFFERENTIAL - Abnormal       Result Value    WBC 16.6 (*)     nRBC 0.0      RBC 5.58      Hemoglobin 17.4      Hematocrit 50.2      MCV 90      MCH 31.2      MCHC 34.7      RDW 12.7       Platelets 215      Neutrophils % 74.4      Immature Granulocytes %, Automated 0.8      Lymphocytes % 18.2      Monocytes % 6.0      Eosinophils % 0.4      Basophils % 0.2      Neutrophils Absolute 12.31 (*)     Immature Granulocytes Absolute, Automated 0.13      Lymphocytes Absolute 3.01      Monocytes Absolute 1.00      Eosinophils Absolute 0.07      Basophils Absolute 0.04     COMPREHENSIVE METABOLIC PANEL - Abnormal    Glucose 158 (*)     Sodium 134 (*)     Potassium 5.1      Chloride 96 (*)     Bicarbonate 19 (*)     Anion Gap 24 (*)     Urea Nitrogen 25 (*)     Creatinine 1.07      eGFR 80      Calcium 9.9      Albumin 4.7      Alkaline Phosphatase 79      Total Protein 9.2 (*)     AST 36      Bilirubin, Total 0.7      ALT 29     POCT GLUCOSE - Abnormal    POCT Glucose 166 (*)    LIPASE - Normal    Lipase 18      Narrative:     Venipuncture immediately after or during the administration of Metamizole may lead to falsely low results. Testing should be performed immediately prior to Metamizole dosing.   BLOOD CULTURE   BLOOD CULTURE   LACTATE   URINALYSIS WITH REFLEX CULTURE AND MICROSCOPIC    Narrative:     The following orders were created for panel order Urinalysis with Reflex Culture and Microscopic.  Procedure                               Abnormality         Status                     ---------                               -----------         ------                     Urinalysis with Reflex C...[[id number]]                                                 Extra Urine Gray Tube[[id number]]                                                         Please view results for these tests on the individual orders.   URINALYSIS WITH REFLEX CULTURE AND MICROSCOPIC   EXTRA URINE GRAY TUBE       All other labs were within normal range or not returned as of this dictation.    Imaging  CT abdomen pelvis w IV contrast   Final Result   1. No acute abnormality within the abdomen or pelvis.   2. Redemonstrated diffuse  hypoattenuation of the liver suggestive of   hepatic steatosis. Consider correlation with liver enzymes.        I personally reviewed the images/study and I agree with the findings   as stated by Toby Hoang MD. This study was interpreted at   [facility], [place], [place].        MACRO:   None.        Signed by: Chris Zapata 5/15/2024 6:47 PM   Dictation workstation:   [id number]              Procedures  Procedures     EMERGENCY DEPARTMENT COURSE/MDM:   Christopher Saldivar is a 58 y.o. male presenting to the ED for evaluation of had concerns including Abdominal Pain..   Medical Decision Making  Tachycardic and with a leukocytosis of 16.  Sepsis workup initiated and he was covered with Zosyn.  Abdomen is sore but not tender.  Suspect secondary to vomiting.  CT obtained, and is grossly unremarkable.  Lipase is normal.  Has anion gap metabolic acidosis with bicarb of 19 and anion gap of 24, suspect lactic acidosis in the setting of persistent vomiting.  Calculated anion gap on VBG is low however.   He does have a leukocytosis.  CTs were obtained which show no obvious infectious process or other concerning findings at this time.  Urine has significant amount of glucose in it, however patient's blood sugar remains within acceptable limits x 2. Urinalysis supports dehydration as his urine is fairly concentrated.  Reassess after fluids and Zofran and patient continues to have discomfort, but he states that it is worse when he tries to swallow.  He has had a history of prior peptic ulcers before, therefore ordered a GI cocktail for him.  Signed out to the oncoming provider pending p.o. challenge following GI cocktail                  External records reviewed: I reviewed external records including outpatient, PCP records, and prior discharge summaries    I have reviewed this case with the ED attending physician, and the attending agrees with the plan. Patient or family was  counselled regarding labs, imaging, likely diagnosis, and plan. All questions were answered.     Katie Gutierrez DO  PGY-4, emergency medicine    The above documentation was completed with the use of speech recognition software. It may contain dictation errors secondary to limitations of the software.      ED Medications administered this visit:    Medications   lactated Ringer's bolus 2,052 mL (2,052 mL intravenous New Bag 5/15/24 1739)   piperacillin-tazobactam-dextrose (Zosyn) IV 4.5 g (0 g intravenous Stopped 5/15/24 1818)   ondansetron (Zofran) injection 4 mg (4 mg intravenous Given 5/15/24 1738)   iohexol (OMNIPaque) 350 mg iodine/mL solution 90 mL (90 mL intravenous Given 5/15/24 1820)       New Prescriptions from this visit:    New Prescriptions    No medications on file       Final Impression: No diagnosis found.      (Please note that portions of this note were completed with a voice recognition program.  Efforts were made to edit the dictations but occasionally words are mis-transcribed.)     Katie Gutierrez DO  Resident  05/15/24 [id number]

## 2024-05-16 VITALS
HEART RATE: 98 BPM | HEIGHT: 68 IN | BODY MASS INDEX: 36.53 KG/M2 | DIASTOLIC BLOOD PRESSURE: 109 MMHG | WEIGHT: 241 LBS | TEMPERATURE: 98.2 F | RESPIRATION RATE: 14 BRPM | SYSTOLIC BLOOD PRESSURE: 138 MMHG | OXYGEN SATURATION: 93 %

## 2024-05-16 LAB — HOLD SPECIMEN: NORMAL

## 2024-05-16 NOTE — PROGRESS NOTES
Emergency Medicine Transition of Care Note.    I assumed care of Christopher Saldivar at signout.  Please see the previous ED provider note for all HPI, PE and MDM prior to my arrival. This is in addition to the primary record.    In brief Christopher Saldivar is an 58 y.o. male presenting for   Chief Complaint   Patient presents with    Abdominal Pain     At the time of signout we were awaiting: P.o. challenge and disposition    Under my care, patient was in no acute distress, he tolerated p.o. intake, discharged home with home-going antiemetics.      Procedure  Procedures    Girma White,

## 2024-05-19 LAB
BACTERIA BLD CULT: NORMAL
BACTERIA BLD CULT: NORMAL